# Patient Record
Sex: FEMALE | Race: WHITE | Employment: UNEMPLOYED | URBAN - METROPOLITAN AREA
[De-identification: names, ages, dates, MRNs, and addresses within clinical notes are randomized per-mention and may not be internally consistent; named-entity substitution may affect disease eponyms.]

---

## 2019-08-06 VITALS
BODY MASS INDEX: 33.98 KG/M2 | WEIGHT: 203.93 LBS | TEMPERATURE: 98 F | OXYGEN SATURATION: 98 % | SYSTOLIC BLOOD PRESSURE: 148 MMHG | DIASTOLIC BLOOD PRESSURE: 85 MMHG | HEART RATE: 69 BPM | RESPIRATION RATE: 14 BRPM | HEIGHT: 65 IN

## 2019-08-06 PROCEDURE — 85027 COMPLETE CBC AUTOMATED: CPT

## 2019-08-06 PROCEDURE — 81001 URINALYSIS AUTO W/SCOPE: CPT

## 2019-08-06 PROCEDURE — 36415 COLL VENOUS BLD VENIPUNCTURE: CPT

## 2019-08-06 PROCEDURE — 99284 EMERGENCY DEPT VISIT MOD MDM: CPT

## 2019-08-06 PROCEDURE — 96372 THER/PROPH/DIAG INJ SC/IM: CPT

## 2019-08-06 PROCEDURE — 96365 THER/PROPH/DIAG IV INF INIT: CPT

## 2019-08-07 ENCOUNTER — HOSPITAL ENCOUNTER (EMERGENCY)
Age: 19
Discharge: HOME | End: 2019-08-07
Attending: EMERGENCY MEDICINE
Payer: COMMERCIAL

## 2019-08-07 DIAGNOSIS — N39.0 URINARY TRACT INFECTION WITH HEMATURIA, SITE UNSPECIFIED: Primary | ICD-10-CM

## 2019-08-07 DIAGNOSIS — R31.9 URINARY TRACT INFECTION WITH HEMATURIA, SITE UNSPECIFIED: Primary | ICD-10-CM

## 2019-08-07 LAB
APPEARANCE UR: ABNORMAL
BACTERIA URNS QL MICRO: NEGATIVE /HPF
BILIRUB UR QL: NEGATIVE
C TRACH DNA SPEC QL NAA+PROBE: NEGATIVE
CLUE CELLS VAG QL WET PREP: NORMAL
COLOR UR: ABNORMAL
EPITH CASTS URNS QL MICRO: ABNORMAL /LPF
ERYTHROCYTE [DISTWIDTH] IN BLOOD BY AUTOMATED COUNT: 12.7 % (ref 11.5–14.5)
GLUCOSE UR STRIP.AUTO-MCNC: NEGATIVE MG/DL
HCT VFR BLD AUTO: 38.9 % (ref 35–47)
HGB BLD-MCNC: 13.1 G/DL (ref 11.5–16)
HGB UR QL STRIP: ABNORMAL
HYALINE CASTS URNS QL MICRO: ABNORMAL /LPF (ref 0–5)
KETONES UR QL STRIP.AUTO: NEGATIVE MG/DL
KOH PREP SPEC: NORMAL
LEUKOCYTE ESTERASE UR QL STRIP.AUTO: ABNORMAL
MCH RBC QN AUTO: 31.8 PG (ref 26–34)
MCHC RBC AUTO-ENTMCNC: 33.7 G/DL (ref 30–36.5)
MCV RBC AUTO: 94.4 FL (ref 80–99)
N GONORRHOEA DNA SPEC QL NAA+PROBE: NEGATIVE
NITRITE UR QL STRIP.AUTO: NEGATIVE
NRBC # BLD: 0 K/UL (ref 0–0.01)
NRBC BLD-RTO: 0 PER 100 WBC
PH UR STRIP: 6.5 [PH] (ref 5–8)
PLATELET # BLD AUTO: 266 K/UL (ref 150–400)
PMV BLD AUTO: 11.6 FL (ref 8.9–12.9)
PROT UR STRIP-MCNC: 300 MG/DL
RBC # BLD AUTO: 4.12 M/UL (ref 3.8–5.2)
RBC #/AREA URNS HPF: >100 /HPF (ref 0–5)
SAMPLE TYPE: NORMAL
SERVICE CMNT-IMP: NORMAL
SERVICE CMNT-IMP: NORMAL
SP GR UR REFRACTOMETRY: 1.01 (ref 1–1.03)
SPECIMEN SOURCE: NORMAL
T VAGINALIS VAG QL WET PREP: NORMAL
UROBILINOGEN UR QL STRIP.AUTO: 0.2 EU/DL (ref 0.2–1)
WBC # BLD AUTO: 15.6 K/UL (ref 3.6–11)
WBC URNS QL MICRO: >100 /HPF (ref 0–4)

## 2019-08-07 PROCEDURE — 87210 SMEAR WET MOUNT SALINE/INK: CPT

## 2019-08-07 PROCEDURE — 87491 CHLMYD TRACH DNA AMP PROBE: CPT

## 2019-08-07 PROCEDURE — 74011250636 HC RX REV CODE- 250/636: Performed by: EMERGENCY MEDICINE

## 2019-08-07 PROCEDURE — 74011000258 HC RX REV CODE- 258: Performed by: EMERGENCY MEDICINE

## 2019-08-07 RX ORDER — ONDANSETRON 4 MG/1
4 TABLET, ORALLY DISINTEGRATING ORAL
Qty: 10 TAB | Refills: 0 | Status: ON HOLD | OUTPATIENT
Start: 2019-08-07 | End: 2020-10-08 | Stop reason: CLARIF

## 2019-08-07 RX ORDER — CEFUROXIME AXETIL 500 MG/1
500 TABLET ORAL 2 TIMES DAILY
Qty: 14 TAB | Refills: 0 | Status: SHIPPED | OUTPATIENT
Start: 2019-08-07 | End: 2019-08-14

## 2019-08-07 RX ADMIN — LIDOCAINE HYDROCHLORIDE 1 G: 10 INJECTION, SOLUTION EPIDURAL; INFILTRATION; INTRACAUDAL; PERINEURAL at 02:10

## 2019-08-07 RX ADMIN — CEFTRIAXONE 1 G: 1 INJECTION, POWDER, FOR SOLUTION INTRAMUSCULAR; INTRAVENOUS at 01:50

## 2019-08-07 NOTE — LETTER
[address] 
[facility] EMERGENCY DEPT 
[address] [place] [id number] [phone] Work/School Note Date: 8/6/2019 To Whom It May concern: 
 
Guillaume Null was seen and treated today in the emergency room by the following provider(s): 
Attending Provider: Jil Herndon MD. Guillaume Null should be excused from work on August 7, 2019. Sincerely, Candace Nguyen MD

## 2019-08-07 NOTE — ED PROVIDER NOTES
EMERGENCY DEPARTMENT HISTORY AND PHYSICAL EXAM      Date: 8/7/2019  Patient Name: Steven Taylor    History of Presenting Illness     Chief Complaint   Patient presents with    Vaginal Bleeding       History Provided By: Patient and Patient's Mother    HPI: Steven Taylor, 22 y.o. female with PMHx significant for vaginal herpes infection, presents to the emergency room with chief complaint of lower abdominal pain, hematuria and urinary frequency. Initially patient started with some burning with urination yesterday which she thought was a herpes flareup, but through the day today she developed urinary frequency, lower abdominal and suprapubic pain with urination, and some hematuria. She decided to come in this evening because she had such severe pain with urinating. She denies nausea, vomiting, diarrhea, flank pain, fever, chills, vaginal discharge, irregular vaginal bleeding. Her last menstrual cycle was 2 weeks ago. PCP: None    No current facility-administered medications on file prior to encounter. Current Outpatient Medications on File Prior to Encounter   Medication Sig Dispense Refill    norgestimate-ethinyl estradiol (ORTHO-CYCLEN, [facility]) 0.25-35 mg-mcg tab Take 1 Tab by mouth daily.  ibuprofen (MOTRIN) 600 mg tablet Take 1 Tab by mouth every six (6) hours as needed for Pain. 20 Tab 0    albuterol (PROVENTIL HFA, VENTOLIN HFA, PROAIR HFA) 90 mcg/actuation inhaler Take 2 Puffs by inhalation every four (4) hours as needed for Wheezing. 1 Inhaler 0    valACYclovir (VALTREX) 500 mg tablet Take 500 mg by mouth daily. Past History     Past Medical History:  Past Medical History:   Diagnosis Date    Asthma     Lumbar herniated disc        Past Surgical History:  No past surgical history on file. Family History:  No family history on file.     Social History:  Social History     Tobacco Use    Smoking status: Never Smoker    Smokeless tobacco: Never Used   Substance Use Topics    Alcohol use: No    Drug use: Not on file       Allergies:  No Known Allergies      Review of Systems   Review of Systems   Constitutional: Negative for chills and fever. HENT: Negative for congestion, ear discharge, sinus pain and sore throat. Eyes: Negative. Respiratory: Negative for cough, chest tightness and shortness of breath. Cardiovascular: Negative for chest pain, palpitations and leg swelling. Gastrointestinal: Negative for abdominal pain, constipation, nausea and vomiting. Genitourinary: Positive for dysuria, frequency, hematuria and pelvic pain. Negative for flank pain, vaginal bleeding and vaginal discharge. Musculoskeletal: Negative for back pain and neck pain. Skin: Negative for rash and wound. Neurological: Negative for syncope, weakness, light-headedness and headaches. Psychiatric/Behavioral: Negative for agitation. The patient is not nervous/anxious.           Physical Exam   General appearance - well nourished, well appearing, and in no distress  Eyes - pupils equal and reactive, extraocular eye movements intact  ENT - mucous membranes moist, pharynx normal without lesions  Neck - supple, no significant adenopathy; non-tender to palpation  Chest - clear to auscultation, no wheezes, rales or rhonchi; non-tender to palpation  Heart - normal rate and regular rhythm, S1 and S2 normal, no murmurs noted  Abdomen - soft, mild suprapubic tenderness, nondistended, no masses or organomegaly  Musculoskeletal - no joint tenderness, deformity or swelling; normal ROM  Extremities - peripheral pulses normal, no pedal edema  Skin - normal coloration and turgor, no rashes  Neurological - alert, oriented x3, normal speech, no focal findings or movement disorder noted    Pelvic exam: External vulva within normal limits, no vaginal bleeding, mild tenderness over uterus/suprapubic area, no tenderness over adnexa, moderate amount of thick white discharge    Diagnostic Study Results Labs -     Recent Results (from the past 12 hour(s))   URINALYSIS W/ RFLX MICROSCOPIC    Collection Time: 08/06/19 10:59 PM   Result Value Ref Range    Color YELLOW/STRAW      Appearance CLOUDY (A) CLEAR      Specific gravity 1.015 1.003 - 1.030      pH (UA) 6.5 5.0 - 8.0      Protein 300 (A) NEG mg/dL    Glucose NEGATIVE  NEG mg/dL    Ketone NEGATIVE  NEG mg/dL    Bilirubin NEGATIVE  NEG      Blood LARGE (A) NEG      Urobilinogen 0.2 0.2 - 1.0 EU/dL    Nitrites NEGATIVE  NEG      Leukocyte Esterase MODERATE (A) NEG      WBC >100 (H) 0 - 4 /hpf    RBC >100 (H) 0 - 5 /hpf    Epithelial cells FEW FEW /lpf    Bacteria NEGATIVE  NEG /hpf    Hyaline cast 2-5 0 - 5 /lpf   CBC W/O DIFF    Collection Time: 08/06/19 11:38 PM   Result Value Ref Range    WBC 15.6 (H) 3.6 - 11.0 K/uL    RBC 4.12 3.80 - 5.20 M/uL    HGB 13.1 11.5 - 16.0 g/dL    HCT 38.9 35.0 - 47.0 %    MCV 94.4 80.0 - 99.0 FL    MCH 31.8 26.0 - 34.0 PG    MCHC 33.7 30.0 - 36.5 g/dL    RDW 12.7 11.5 - 14.5 %    PLATELET 325 110 - 377 K/uL    MPV 11.6 8.9 - 12.9 FL    NRBC 0.0 0  WBC    ABSOLUTE NRBC 0.00 0.00 - 0.01 K/uL   KOH, OTHER SOURCES    Collection Time: 08/07/19  1:50 AM   Result Value Ref Range    Special Requests: NO SPECIAL REQUESTS      KOH NO YEAST SEEN     WET PREP    Collection Time: 08/07/19  1:50 AM   Result Value Ref Range    Clue cells CLUE CELLS ABSENT      Wet prep NO TRICHOMONAS SEEN         Radiologic Studies -   No orders to display     CT Results  (Last 48 hours)    None        CXR Results  (Last 48 hours)    None            Medical Decision Making   I am the first provider for this patient. I reviewed the vital signs, available nursing notes, past medical history, past surgical history, family history and social history. Vital Signs-Reviewed the patient's vital signs.   Patient Vitals for the past 12 hrs:   Temp Pulse Resp BP SpO2   08/06/19 2252 98 °F (36.7 °C) 69 14 148/85 98 %           Records Reviewed: Nursing Notes and Old Medical Records    Provider Notes (Medical Decision Making):   Differential diagnosis: UTI, vaginal infection, herpes outbreak,    ED Course:   Initial assessment performed. The patients presenting problems have been discussed, and they are in agreement with the care plan formulated and outlined with them. I have encouraged them to ask questions as they arise throughout their visit. Progress Notes:       UA shows significant white cells and red cells although no bacteria seen. Given patient's symptoms we will treat for UTI. Disposition  Discharged home. PLAN:  1. Current Discharge Medication List      START taking these medications    Details   cefUROXime (CEFTIN) 500 mg tablet Take 1 Tab by mouth two (2) times a day for 7 days. Qty: 14 Tab, Refills: 0      ondansetron (ZOFRAN ODT) 4 mg disintegrating tablet Take 1 Tab by mouth every eight (8) hours as needed for Nausea. Qty: 10 Tab, Refills: 0           2. Follow-up Information     Follow up With Specialties Details Why Contact Info    [facility] EMERGENCY DEPT Emergency Medicine  If symptoms worsen [address] [place] [address]  [phone]        Return to ED if worse     Diagnosis     Clinical Impression:   1.  Urinary tract infection with hematuria, site unspecified

## 2019-08-07 NOTE — ED TRIAGE NOTES
Vaginal pain for couple of days. Uterine pain today. Vaginal bleeding started today. Also complaints of pain after urination.

## 2019-08-07 NOTE — ED NOTES
Pt discharged home with written and verbal instructions given by Dr. Audra Pemberton and patient ambulated out of ED without difficulty.

## 2019-08-07 NOTE — DISCHARGE INSTRUCTIONS
Patient Education        Blood in the Urine: Care Instructions  Your Care Instructions    Blood in the urine, or hematuria, may make the urine look red, brown, or pink. There may be blood every time you urinate or just from time to time. You cannot always see blood in the urine, but it will show up in a urine test.  Blood in the urine may be serious. It should always be checked by a doctor. Your doctor may recommend more tests, including an X-ray, a CT scan, or a cystoscopy (which lets a doctor look inside the urethra and bladder). Blood in the urine can be a sign of another problem. Common causes are bladder infections and kidney stones. An injury to your groin or your genital area can also cause bleeding in the urinary tract. Very hard exercise--such as running a marathon--can cause blood in the urine. Blood in the urine can also be a sign of kidney disease or cancer in the bladder or kidney. Many cases of blood in the urine are caused by a harmless condition that runs in families. This is called benign familial hematuria. It does not need any treatment. Sometimes your urine may look red or brown even though it does not contain blood. For example, not getting enough fluids (dehydration), taking certain medicines, or having a liver problem can change the color of your urine. Eating foods such as beets, rhubarb, or blackberries or foods with red food coloring can make your urine look red or pink. Follow-up care is a key part of your treatment and safety. Be sure to make and go to all appointments, and call your doctor if you are having problems. It's also a good idea to know your test results and keep a list of the medicines you take. When should you call for help? Call your doctor now or seek immediate medical care if:    · You have symptoms of a urinary infection. For example:  ? You have pus in your urine. ? You have pain in your back just below your rib cage. This is called flank pain. ?  You have a fever, chills, or body aches. ? It hurts to urinate. ? You have groin or belly pain.     · You have more blood in your urine.    Watch closely for changes in your health, and be sure to contact your doctor if:    · You have new urination problems.     · You do not get better as expected. Where can you learn more? Go to http://dionne-roldan.info/. Enter D656 in the search box to learn more about \"Blood in the Urine: Care Instructions. \"  Current as of: December 19, 2018  Content Version: 12.1  © 0252-4016 [employer]. Care instructions adapted under license by [employer] (which disclaims liability or warranty for this information). If you have questions about a medical condition or this instruction, always ask your healthcare professional. [address] any warranty or liability for your use of this information. Patient Education        Urinary Tract Infection in Women: Care Instructions  Your Care Instructions    A urinary tract infection, or UTI, is a general term for an infection anywhere between the kidneys and the urethra (where urine comes out). Most UTIs are bladder infections. They often cause pain or burning when you urinate. UTIs are caused by bacteria and can be cured with antibiotics. Be sure to complete your treatment so that the infection goes away. Follow-up care is a key part of your treatment and safety. Be sure to make and go to all appointments, and call your doctor if you are having problems. It's also a good idea to know your test results and keep a list of the medicines you take. How can you care for yourself at home? · Take your antibiotics as directed. Do not stop taking them just because you feel better. You need to take the full course of antibiotics. · Drink extra water and other fluids for the next day or two. This may help wash out the bacteria that are causing the infection.  (If you have kidney, heart, or liver disease and have to limit fluids, talk with your doctor before you increase your fluid intake.)  · Avoid drinks that are carbonated or have caffeine. They can irritate the bladder. · Urinate often. Try to empty your bladder each time. · To relieve pain, take a hot bath or lay a heating pad set on low over your lower belly or genital area. Never go to sleep with a heating pad in place. To prevent UTIs  · Drink plenty of water each day. This helps you urinate often, which clears bacteria from your system. (If you have kidney, heart, or liver disease and have to limit fluids, talk with your doctor before you increase your fluid intake.)  · Urinate when you need to. · Urinate right after you have sex. · Change sanitary pads often. · Avoid douches, bubble baths, feminine hygiene sprays, and other feminine hygiene products that have deodorants. · After going to the bathroom, wipe from front to back. When should you call for help? Call your doctor now or seek immediate medical care if:    · Symptoms such as fever, chills, nausea, or vomiting get worse or appear for the first time.     · You have new pain in your back just below your rib cage. This is called flank pain.     · There is new blood or pus in your urine.     · You have any problems with your antibiotic medicine.    Watch closely for changes in your health, and be sure to contact your doctor if:    · You are not getting better after taking an antibiotic for 2 days.     · Your symptoms go away but then come back. Where can you learn more? Go to http://dionne-roldan.info/. Enter K031 in the search box to learn more about \"Urinary Tract Infection in Women: Care Instructions. \"  Current as of: December 19, 2018  Content Version: 12.1  © 4920-0045 [employer]. Care instructions adapted under license by [employer] (which disclaims liability or warranty for this information).  If you have questions about a medical condition or this instruction, always ask your healthcare professional. [place] [address] any warranty or liability for your use of this information.

## 2020-05-08 LAB
ANTIBODY SCREEN, EXTERNAL: NEGATIVE
CHLAMYDIA, EXTERNAL: NEGATIVE
HBSAG, EXTERNAL: NEGATIVE
HIV, EXTERNAL: NONREACTIVE
N. GONORRHEA, EXTERNAL: NEGATIVE
RPR, EXTERNAL: NONREACTIVE
RUBELLA, EXTERNAL: NORMAL
T. PALLIDUM, EXTERNAL: NONREACTIVE
TYPE, ABO & RH, EXTERNAL: NORMAL

## 2020-09-21 LAB — ANTIBODY SCREEN, EXTERNAL: NEGATIVE

## 2020-10-08 ENCOUNTER — HOSPITAL ENCOUNTER (OUTPATIENT)
Age: 20
Discharge: HOME | End: 2020-10-09
Attending: EMERGENCY MEDICINE | Admitting: OBSTETRICS & GYNECOLOGY
Payer: COMMERCIAL

## 2020-10-08 DIAGNOSIS — O26.899 ABDOMINAL PAIN AFFECTING PREGNANCY: Primary | ICD-10-CM

## 2020-10-08 DIAGNOSIS — R10.9 ABDOMINAL PAIN AFFECTING PREGNANCY: Primary | ICD-10-CM

## 2020-10-08 PROBLEM — Z34.90 PREGNANCY: Status: ACTIVE | Noted: 2020-10-08

## 2020-10-08 LAB
APPEARANCE UR: CLEAR
BACTERIA URNS QL MICRO: NEGATIVE /HPF
BASOPHILS # BLD: 0 K/UL (ref 0–0.1)
BASOPHILS NFR BLD: 0 % (ref 0–1)
BILIRUB UR QL: NEGATIVE
COLOR UR: ABNORMAL
DIFFERENTIAL METHOD BLD: ABNORMAL
EOSINOPHIL # BLD: 0.1 K/UL (ref 0–0.4)
EOSINOPHIL NFR BLD: 0 % (ref 0–7)
EPITH CASTS URNS QL MICRO: ABNORMAL /LPF
ERYTHROCYTE [DISTWIDTH] IN BLOOD BY AUTOMATED COUNT: 12.7 % (ref 11.5–14.5)
GLUCOSE UR STRIP.AUTO-MCNC: NEGATIVE MG/DL
HCT VFR BLD AUTO: 33.9 % (ref 35–47)
HGB BLD-MCNC: 11.6 G/DL (ref 11.5–16)
HGB UR QL STRIP: NEGATIVE
HYALINE CASTS URNS QL MICRO: ABNORMAL /LPF (ref 0–5)
IMM GRANULOCYTES # BLD AUTO: 0.1 K/UL (ref 0–0.04)
IMM GRANULOCYTES NFR BLD AUTO: 1 % (ref 0–0.5)
KETONES UR QL STRIP.AUTO: ABNORMAL MG/DL
LEUKOCYTE ESTERASE UR QL STRIP.AUTO: ABNORMAL
LYMPHOCYTES # BLD: 2.6 K/UL (ref 0.8–3.5)
LYMPHOCYTES NFR BLD: 19 % (ref 12–49)
MCH RBC QN AUTO: 32.2 PG (ref 26–34)
MCHC RBC AUTO-ENTMCNC: 34.2 G/DL (ref 30–36.5)
MCV RBC AUTO: 94.2 FL (ref 80–99)
MONOCYTES # BLD: 0.7 K/UL (ref 0–1)
MONOCYTES NFR BLD: 5 % (ref 5–13)
NEUTS SEG # BLD: 10.3 K/UL (ref 1.8–8)
NEUTS SEG NFR BLD: 75 % (ref 32–75)
NITRITE UR QL STRIP.AUTO: NEGATIVE
NRBC # BLD: 0 K/UL (ref 0–0.01)
NRBC BLD-RTO: 0 PER 100 WBC
PH UR STRIP: 6.5 [PH] (ref 5–8)
PLATELET # BLD AUTO: 222 K/UL (ref 150–400)
PMV BLD AUTO: 11.8 FL (ref 8.9–12.9)
PROT UR STRIP-MCNC: NEGATIVE MG/DL
RBC # BLD AUTO: 3.6 M/UL (ref 3.8–5.2)
RBC #/AREA URNS HPF: ABNORMAL /HPF (ref 0–5)
SP GR UR REFRACTOMETRY: 1.01 (ref 1–1.03)
UA: UC IF INDICATED,UAUC: ABNORMAL
UROBILINOGEN UR QL STRIP.AUTO: 0.2 EU/DL (ref 0.2–1)
WBC # BLD AUTO: 13.7 K/UL (ref 3.6–11)
WBC URNS QL MICRO: ABNORMAL /HPF (ref 0–4)

## 2020-10-08 PROCEDURE — 81001 URINALYSIS AUTO W/SCOPE: CPT

## 2020-10-08 PROCEDURE — 85025 COMPLETE CBC W/AUTO DIFF WBC: CPT

## 2020-10-08 PROCEDURE — 83615 LACTATE (LD) (LDH) ENZYME: CPT

## 2020-10-08 PROCEDURE — 80053 COMPREHEN METABOLIC PANEL: CPT

## 2020-10-08 PROCEDURE — 84550 ASSAY OF BLOOD/URIC ACID: CPT

## 2020-10-08 PROCEDURE — 84156 ASSAY OF PROTEIN URINE: CPT

## 2020-10-08 PROCEDURE — 36415 COLL VENOUS BLD VENIPUNCTURE: CPT

## 2020-10-08 PROCEDURE — 75810000275 HC EMERGENCY DEPT VISIT NO LEVEL OF CARE

## 2020-10-08 RX ORDER — CETIRIZINE HCL 10 MG
10 TABLET ORAL
COMMUNITY

## 2020-10-08 RX ORDER — VITAMIN A ACETATE, BETA CAROTENE, ASCORBIC ACID, CHOLECALCIFEROL, .ALPHA.-TOCOPHEROL ACETATE, DL-, THIAMINE MONONITRATE, RIBOFLAVIN, NIACINAMIDE, PYRIDOXINE HYDROCHLORIDE, FOLIC ACID, CYANOCOBALAMIN, CALCIUM CARBONATE, FERROUS FUMARATE, ZINC OXIDE, CUPRIC OXIDE 3080; 12; 120; 400; 1; 1.84; 3; 20; 22; 920; 25; 200; 27; 10; 2 [IU]/1; UG/1; MG/1; [IU]/1; MG/1; MG/1; MG/1; MG/1; MG/1; [IU]/1; MG/1; MG/1; MG/1; MG/1; MG/1
1 TABLET, FILM COATED ORAL DAILY
COMMUNITY

## 2020-10-09 VITALS
HEART RATE: 85 BPM | SYSTOLIC BLOOD PRESSURE: 112 MMHG | WEIGHT: 240 LBS | RESPIRATION RATE: 18 BRPM | BODY MASS INDEX: 40.97 KG/M2 | DIASTOLIC BLOOD PRESSURE: 62 MMHG | OXYGEN SATURATION: 96 % | TEMPERATURE: 98.2 F | HEIGHT: 64 IN

## 2020-10-09 LAB
ALBUMIN SERPL-MCNC: 2.9 G/DL (ref 3.5–5)
ALBUMIN/GLOB SERPL: 0.7 {RATIO} (ref 1.1–2.2)
ALP SERPL-CCNC: 93 U/L (ref 45–117)
ALT SERPL-CCNC: 22 U/L (ref 12–78)
ANION GAP SERPL CALC-SCNC: 8 MMOL/L (ref 5–15)
AST SERPL-CCNC: 16 U/L (ref 15–37)
BILIRUB SERPL-MCNC: 0.2 MG/DL (ref 0.2–1)
BUN SERPL-MCNC: 9 MG/DL (ref 6–20)
BUN/CREAT SERPL: 13 (ref 12–20)
CALCIUM SERPL-MCNC: 8.5 MG/DL (ref 8.5–10.1)
CHLORIDE SERPL-SCNC: 109 MMOL/L (ref 97–108)
CO2 SERPL-SCNC: 21 MMOL/L (ref 21–32)
CREAT SERPL-MCNC: 0.72 MG/DL (ref 0.55–1.02)
CREAT UR-MCNC: 89.2 MG/DL
GLOBULIN SER CALC-MCNC: 4.2 G/DL (ref 2–4)
GLUCOSE SERPL-MCNC: 132 MG/DL (ref 65–100)
LDH SERPL L TO P-CCNC: 210 U/L (ref 81–246)
POTASSIUM SERPL-SCNC: 3.4 MMOL/L (ref 3.5–5.1)
PROT SERPL-MCNC: 7.1 G/DL (ref 6.4–8.2)
PROT UR-MCNC: 6 MG/DL (ref 0–11.9)
PROT/CREAT UR-RTO: 0.1
SODIUM SERPL-SCNC: 138 MMOL/L (ref 136–145)
URATE SERPL-MCNC: 4.4 MG/DL (ref 2.6–6)

## 2020-10-09 PROCEDURE — 59025 FETAL NON-STRESS TEST: CPT

## 2020-10-09 PROCEDURE — 99285 EMERGENCY DEPT VISIT HI MDM: CPT

## 2020-10-09 NOTE — PROCEDURES
Non-Stress Test    Brief history, indication: intermittent tightening  In the abdomen    Findings:  Baseline  bpm; moderate variability; greater than two accelerations to 150 bpm; no significant decelerations noted.     Result: Reactive NST    Nonstress test interpreted by myself      Krystin Hudson MD

## 2020-10-09 NOTE — ED PROVIDER NOTES
10:36 PM    23 y.o. female presents by private vehicle. to ED with a c/o intermittent abdominal tightness. Pt reports sx began 4.5 hours ago. Pt denies vaginal bleeding or leaking of fluid. Fetal movement in 24 hrs: yes     Gravid 1 Para 0  0   approximately 30 weeks pregnant  Hx of prior: none    PMHx is significant for: none  PSHx is significant for: unknown  Social Hx: denies Tobacco, denies EtOH, denies Illicit Drugs    Is blood pressure high?: no  Swelling in legs?: no    There are no other sxs or complaints noted at this time. ROS:  1) Denies chest chest pain, palpitations  2) Denies nausea or vomiting  All other Systems Negative    Patient Vitals for the past 12 hrs:   Temp Pulse Resp BP SpO2   10/08/20 2236 98.6 °F (37 °C) 98 20 (!) 132/98 100 %       PE:  General appearance - well nourished, well appearing, and in no distress  Eyes - pupils equal and reactive, extraocular eye movements intact  ENT - mucous membranes moist, pharynx normal without lesions  Neck - supple, no significant adenopathy;   Chest - clear to auscultation, no wheezes, rales or rhonchi; non-tender to palpation  Heart - normal rate and regular rhythm, S1 and S2 normal, no murmurs noted  Abdomen - soft, nontender, nondistended, Gravid Uterus  Musculoskeletal - no joint tenderness, deformity or swelling; normal ROM  Extremities - peripheral pulses normal, no pedal edema  Skin - normal coloration and turgor, no rashes  Neurological - alert, oriented x3, normal speech, no focal findings or movement disorder noted    PLAN:  1. Sent to Labor and Delivery.

## 2020-10-09 NOTE — H&P
OB History & Physical    Name: Pat Son MRN: [id number]  SSN: [id number]    YOB: 2000  Age: 23 y.o. Sex: female      Subjective:     Reason for Admission:  Pregnancy and stomach tighteneing    History of Present Illness: Pat Son is a 23 y.o.  female with an estimated gestational age of 30w3d with Estimated Date of Delivery: 20. Patient complains of intermittent tightening in her stomach since earlier this evening. She denies significant cramping, bleeding, ROM. Baby has been active. She denies NVFSC. The patient reports a history of HSV, had an outbreak 1 week ago. OB History        1    Para        Term                AB        Living           SAB        TAB        Ectopic        Molar        Multiple        Live Births                  Past Medical History:   Diagnosis Date    Asthma     no recent inhaler use    Genital herpes     Herpes gestationis     this week    Lumbar herniated disc      History reviewed. No pertinent surgical history. Social History     Occupational History    Not on file   Tobacco Use    Smoking status: Never Smoker    Smokeless tobacco: Never Used   Substance and Sexual Activity    Alcohol use: No    Drug use: Never    Sexual activity: Yes     Family History   Problem Relation Age of Onset    Asthma Mother     Diabetes Mother     Hypertension Mother     Cancer Maternal Grandmother     Lung Disease Maternal Grandmother     Cancer Maternal Grandfather     Diabetes Maternal Grandfather     Elevated Lipids Maternal Grandfather     Cancer Paternal Grandmother     Dementia Paternal Grandmother     Cancer Paternal Grandfather     Diabetes Paternal Grandfather     Hypertension Paternal Grandfather     Kidney Disease Paternal Grandfather        No Known Allergies  Prior to Admission medications    Medication Sig Start Date End Date Taking?  Authorizing Provider   cetirizine (ZyrTEC) 10 mg tablet Take 10 mg by mouth.   Yes Provider, Historical   prenatal vit-calcium-iron-fa (Prenatal Plus, calcium carb,) 27 mg iron- 1 mg tab Take 1 Tab by mouth daily. Yes Provider, Historical   valACYclovir (VALTREX) 500 mg tablet Take 500 mg by mouth daily.    Yes Other, MD Zo        Review of Systems:  General: Denies fever, sweats, chills  CV: Denies CP, palpitations  Resp: Denies SOB, cough  GI: Denies nausea, vomiting, diarrhea  : Denies dysura, abnormal frequency, hematuria  Neuro: Denies HA, visual disturbance  All other systems reviewed and are negative    Objective:     Vitals:    Vitals:    10/08/20 2352 10/09/20 0001 10/09/20 0011 10/09/20 0021   BP: (!) 110/55 (!) 110/55 112/60 112/62   Pulse: 80 88 82 85   Resp:       Temp:       SpO2:  98% 97% 96%   Weight:       Height:          Temp (24hrs), Av.4 °F (36.9 °C), Min:98.2 °F (36.8 °C), Max:98.6 °F (37 °C)    BP  Min: 110/55  Max: 152/83     Physical Exam  General: in NAD  HEENT: normocephalic  Back: no CVAT  Abdomen: Gravid, soft, nontender, no abnormal masses, rebound, rigidity, guarding  Fundus: soft, nontender  Extremities: no abnormal cyanosis, clubbing, edema  Skin: warm, dry, no abnormal lesions noted  Neurological: DTR's 1-2+ no clonus  Pelvic:Cervical Exam: not checked  Uterine Activity: no contractions detected  Membranes: no gross evidence of ROM  Fetal Heart Rate: adequate variability and reactivity; no significant abnormal decelerations    Labs:   Recent Results (from the past 24 hour(s))   URINALYSIS W/ REFLEX CULTURE    Collection Time: 10/08/20 11:31 PM    Specimen: Urine   Result Value Ref Range    Color YELLOW/STRAW      Appearance CLEAR CLEAR      Specific gravity 1.011 1.003 - 1.030      pH (UA) 6.5 5.0 - 8.0      Protein Negative NEG mg/dL    Glucose Negative NEG mg/dL    Ketone TRACE (A) NEG mg/dL    Bilirubin Negative NEG      Blood Negative NEG      Urobilinogen 0.2 0.2 - 1.0 EU/dL    Nitrites Negative NEG      Leukocyte Esterase TRACE (A) NEG WBC 0-4 0 - 4 /hpf    RBC 0-5 0 - 5 /hpf    Epithelial cells FEW FEW /lpf    Bacteria Negative NEG /hpf    UA:UC IF INDICATED CULTURE NOT INDICATED BY UA RESULT CNI      Hyaline cast 0-2 0 - 5 /lpf   PROTEIN/CREATININE RATIO, URINE    Collection Time: 10/08/20 11:31 PM   Result Value Ref Range    Protein, urine random 6 0.0 - 11.9 mg/dL    Creatinine, urine 89.20 mg/dL    Protein/Creat. urine Ratio 0.1     CBC WITH AUTOMATED DIFF    Collection Time: 10/08/20 11:31 PM   Result Value Ref Range    WBC 13.7 (H) 3.6 - 11.0 K/uL    RBC 3.60 (L) 3.80 - 5.20 M/uL    HGB 11.6 11.5 - 16.0 g/dL    HCT 33.9 (L) 35.0 - 47.0 %    MCV 94.2 80.0 - 99.0 FL    MCH 32.2 26.0 - 34.0 PG    MCHC 34.2 30.0 - 36.5 g/dL    RDW 12.7 11.5 - 14.5 %    PLATELET 152 232 - 864 K/uL    MPV 11.8 8.9 - 12.9 FL    NRBC 0.0 0  WBC    ABSOLUTE NRBC 0.00 0.00 - 0.01 K/uL    NEUTROPHILS 75 32 - 75 %    LYMPHOCYTES 19 12 - 49 %    MONOCYTES 5 5 - 13 %    EOSINOPHILS 0 0 - 7 %    BASOPHILS 0 0 - 1 %    IMMATURE GRANULOCYTES 1 (H) 0.0 - 0.5 %    ABS. NEUTROPHILS 10.3 (H) 1.8 - 8.0 K/UL    ABS. LYMPHOCYTES 2.6 0.8 - 3.5 K/UL    ABS. MONOCYTES 0.7 0.0 - 1.0 K/UL    ABS. EOSINOPHILS 0.1 0.0 - 0.4 K/UL    ABS. BASOPHILS 0.0 0.0 - 0.1 K/UL    ABS. IMM. GRANS. 0.1 (H) 0.00 - 0.04 K/UL    DF AUTOMATED     METABOLIC PANEL, COMPREHENSIVE    Collection Time: 10/08/20 11:31 PM   Result Value Ref Range    Sodium 138 136 - 145 mmol/L    Potassium 3.4 (L) 3.5 - 5.1 mmol/L    Chloride 109 (H) 97 - 108 mmol/L    CO2 21 21 - 32 mmol/L    Anion gap 8 5 - 15 mmol/L    Glucose 132 (H) 65 - 100 mg/dL    BUN 9 6 - 20 MG/DL    Creatinine 0.72 0.55 - 1.02 MG/DL    BUN/Creatinine ratio 13 12 - 20      GFR est AA >60 >60 ml/min/1.73m2    GFR est non-AA >60 >60 ml/min/1.73m2    Calcium 8.5 8.5 - 10.1 MG/DL    Bilirubin, total 0.2 0.2 - 1.0 MG/DL    ALT (SGPT) 22 12 - 78 U/L    AST (SGOT) 16 15 - 37 U/L    Alk.  phosphatase 93 45 - 117 U/L    Protein, total 7.1 6.4 - 8.2 g/dL    Albumin 2.9 (L) 3.5 - 5.0 g/dL    Globulin 4.2 (H) 2.0 - 4.0 g/dL    A-G Ratio 0.7 (L) 1.1 - 2.2     LD    Collection Time: 10/08/20 11:31 PM   Result Value Ref Range     81 - 246 U/L   URIC ACID    Collection Time: 10/08/20 11:31 PM   Result Value Ref Range    Uric acid 4.4 2.6 - 6.0 MG/DL       Patient Active Problem List   Diagnosis Code    Pregnancy Z34.90     Assessment and Plan:     30 week IUP, no evidence or significant  contractions. Discharge home  Follow-up with the office in the morning  Precautions reviewed; questions answered.     Signed By:  Abril Isaac MD     2020

## 2020-10-09 NOTE — PROGRESS NOTES
2242- here from home c/o abdominal tightening since 1830. Patient states positive fetal movement, denies leaking fluids, vaginal bleeding, headache or blurred vision. Patient denies urinary frequency, painful urination or intercourse within the last 48 hours. Patient placed on monitor. 2318-Dr. Juan Otto notified of patient arrival and assessment, orders received to send labs for elevated pressures. 0027-Dr. Juan Otto in to see patient, patient off monitor,  BPM. POC discussed, orders received to discharge patient home. 0035-Discharge instructions reviewed and signed by patient, patient given copy of instructions. Patient ambulatory off unit with significant other with no signs of distress or labor.

## 2020-10-09 NOTE — DISCHARGE INSTRUCTIONS
Patient Education        Weeks 30 to 28 of Your Pregnancy: Care Instructions  Your Care Instructions     You have made it to the final months of your pregnancy. By now, your baby is really starting to look like a baby, with hair and plump skin. As you enter the final weeks of pregnancy, the reality of having a baby may start to set in. This is the time to settle on a name, get your household in order, set up a safe nursery, and find quality  if needed. Doing these things in advance will allow you to focus on caring for and enjoying your new baby. You may also want to have a tour of your hospital's labor and delivery unit to get a better idea of what to expect while you are in the hospital.  During these last months, it is very important to take good care of yourself and pay attention to what your body needs. If your doctor says it is okay for you to work, don't push yourself too hard. Use the tips provided in this care sheet to ease heartburn and care for varicose veins. If you haven't already had the Tdap shot during this pregnancy, talk to your doctor about getting it. It will help protect your  against pertussis infection. Follow-up care is a key part of your treatment and safety. Be sure to make and go to all appointments, and call your doctor if you are having problems. It's also a good idea to know your test results and keep a list of the medicines you take. How can you care for yourself at home? Pay attention to your baby's movements  · You should feel your baby move several times every day. · Your baby now turns less, and kicks and jabs more. · Your baby sleeps 20 to 45 minutes at a time and is more active at certain times of day. · If your doctor wants you to count your baby's kicks:  ? Empty your bladder, and lie on your side or relax in a comfortable chair. ? Write down your start time. ? Pay attention only to your baby's movements. Count any movement except hiccups. ?  After you have counted 10 movements, write down your stop time. ? Write down how many minutes it took for your baby to move 10 times. ? If an hour goes by and you have not recorded 10 movements, have something to eat or drink and then count for another hour. If you do not record 10 movements in either hour, call your doctor. Ease heartburn  · Eat small, frequent meals. · Do not eat chocolate, peppermint, or very spicy foods. Avoid drinks with caffeine, such as coffee, tea, and sodas. · Avoid bending over or lying down after meals. · Talk a short walk after you eat. · If heartburn is a problem at night, do not eat for 2 hours before bedtime. · Take antacids like Mylanta, Maalox, Rolaids, or Tums. Do not take antacids that have sodium bicarbonate. Care for varicose veins  · Varicose veins are blood vessels that stretch out with the extra blood during pregnancy. Your legs may ache or throb. Most varicose veins will go away after the birth. · Avoid standing for long periods of time. Sit with your legs crossed at the ankles, not the knees. · Sit with your feet propped up. · Avoid tight clothing or stockings. Wear support hose. · Exercise regularly. Try walking for at least 30 minutes a day. Where can you learn more? Go to http://www.gray.com/  Enter X471 in the search box to learn more about \"Weeks 30 to 32 of Your Pregnancy: Care Instructions. \"  Current as of: February 11, 2020               Content Version: 12.6  © 6042-1428 [employer]. Care instructions adapted under license by [employer] (which disclaims liability or warranty for this information). If you have questions about a medical condition or this instruction, always ask your healthcare professional. [address] any warranty or liability for your use of this information.

## 2020-11-16 LAB
GRBS, EXTERNAL: NEGATIVE
T. PALLIDUM, EXTERNAL: NONREACTIVE

## 2020-12-03 ENCOUNTER — HOSPITAL ENCOUNTER (OUTPATIENT)
Dept: PREADMISSION TESTING | Age: 20
Discharge: HOME | End: 2020-12-03
Payer: COMMERCIAL

## 2020-12-03 PROCEDURE — 87635 SARS-COV-2 COVID-19 AMP PRB: CPT

## 2020-12-04 LAB
HEALTH STATUS, XMCV2T: NORMAL
SARS-COV-2, COV2NT: NOT DETECTED
SOURCE, COVRS: NORMAL
SPECIMEN SOURCE, FCOV2M: NORMAL
SPECIMEN TYPE, XMCV1T: NORMAL

## 2020-12-10 ENCOUNTER — HOSPITAL ENCOUNTER (INPATIENT)
Age: 20
LOS: 3 days | Discharge: HOME | End: 2020-12-13
Attending: OBSTETRICS & GYNECOLOGY | Admitting: OBSTETRICS & GYNECOLOGY
Payer: COMMERCIAL

## 2020-12-10 PROBLEM — Z34.90 NORMAL PREGNANCY: Status: ACTIVE | Noted: 2020-12-10

## 2020-12-10 LAB
ALBUMIN SERPL-MCNC: 2.6 G/DL (ref 3.5–5)
ALBUMIN SERPL-MCNC: 2.7 G/DL (ref 3.5–5)
ALBUMIN/GLOB SERPL: 0.6 {RATIO} (ref 1.1–2.2)
ALBUMIN/GLOB SERPL: 0.6 {RATIO} (ref 1.1–2.2)
ALP SERPL-CCNC: 171 U/L (ref 45–117)
ALP SERPL-CCNC: 175 U/L (ref 45–117)
ALT SERPL-CCNC: 17 U/L (ref 12–78)
ALT SERPL-CCNC: 19 U/L (ref 12–78)
ANION GAP SERPL CALC-SCNC: 7 MMOL/L (ref 5–15)
ANION GAP SERPL CALC-SCNC: 8 MMOL/L (ref 5–15)
AST SERPL-CCNC: 25 U/L (ref 15–37)
AST SERPL-CCNC: 26 U/L (ref 15–37)
BASOPHILS # BLD: 0 K/UL (ref 0–0.1)
BASOPHILS NFR BLD: 0 % (ref 0–1)
BILIRUB SERPL-MCNC: 0.2 MG/DL (ref 0.2–1)
BILIRUB SERPL-MCNC: 0.5 MG/DL (ref 0.2–1)
BUN SERPL-MCNC: 13 MG/DL (ref 6–20)
BUN SERPL-MCNC: 13 MG/DL (ref 6–20)
BUN/CREAT SERPL: 13 (ref 12–20)
BUN/CREAT SERPL: 14 (ref 12–20)
CALCIUM SERPL-MCNC: 8.5 MG/DL (ref 8.5–10.1)
CALCIUM SERPL-MCNC: 8.8 MG/DL (ref 8.5–10.1)
CHLORIDE SERPL-SCNC: 107 MMOL/L (ref 97–108)
CHLORIDE SERPL-SCNC: 108 MMOL/L (ref 97–108)
CO2 SERPL-SCNC: 22 MMOL/L (ref 21–32)
CO2 SERPL-SCNC: 22 MMOL/L (ref 21–32)
CREAT SERPL-MCNC: 0.91 MG/DL (ref 0.55–1.02)
CREAT SERPL-MCNC: 1 MG/DL (ref 0.55–1.02)
CREAT UR-MCNC: 113 MG/DL
DIFFERENTIAL METHOD BLD: ABNORMAL
EOSINOPHIL # BLD: 0.1 K/UL (ref 0–0.4)
EOSINOPHIL NFR BLD: 1 % (ref 0–7)
ERYTHROCYTE [DISTWIDTH] IN BLOOD BY AUTOMATED COUNT: 13.9 % (ref 11.5–14.5)
GLOBULIN SER CALC-MCNC: 4.1 G/DL (ref 2–4)
GLOBULIN SER CALC-MCNC: 4.2 G/DL (ref 2–4)
GLUCOSE SERPL-MCNC: 82 MG/DL (ref 65–100)
GLUCOSE SERPL-MCNC: 99 MG/DL (ref 65–100)
HCT VFR BLD AUTO: 34 % (ref 35–47)
HGB BLD-MCNC: 11.5 G/DL (ref 11.5–16)
IMM GRANULOCYTES # BLD AUTO: 0 K/UL (ref 0–0.04)
IMM GRANULOCYTES NFR BLD AUTO: 0 % (ref 0–0.5)
LYMPHOCYTES # BLD: 2.4 K/UL (ref 0.8–3.5)
LYMPHOCYTES NFR BLD: 22 % (ref 12–49)
MCH RBC QN AUTO: 31.4 PG (ref 26–34)
MCHC RBC AUTO-ENTMCNC: 33.8 G/DL (ref 30–36.5)
MCV RBC AUTO: 92.9 FL (ref 80–99)
MONOCYTES # BLD: 0.6 K/UL (ref 0–1)
MONOCYTES NFR BLD: 6 % (ref 5–13)
NEUTS SEG # BLD: 7.7 K/UL (ref 1.8–8)
NEUTS SEG NFR BLD: 71 % (ref 32–75)
NRBC # BLD: 0 K/UL (ref 0–0.01)
NRBC BLD-RTO: 0 PER 100 WBC
PLATELET # BLD AUTO: 225 K/UL (ref 150–400)
PMV BLD AUTO: 12.3 FL (ref 8.9–12.9)
POTASSIUM SERPL-SCNC: 4 MMOL/L (ref 3.5–5.1)
POTASSIUM SERPL-SCNC: 4.1 MMOL/L (ref 3.5–5.1)
PROT SERPL-MCNC: 6.7 G/DL (ref 6.4–8.2)
PROT SERPL-MCNC: 6.9 G/DL (ref 6.4–8.2)
PROT UR-MCNC: 22 MG/DL (ref 0–11.9)
PROT/CREAT UR-RTO: 0.2
RBC # BLD AUTO: 3.66 M/UL (ref 3.8–5.2)
SODIUM SERPL-SCNC: 137 MMOL/L (ref 136–145)
SODIUM SERPL-SCNC: 137 MMOL/L (ref 136–145)
WBC # BLD AUTO: 10.8 K/UL (ref 3.6–11)

## 2020-12-10 PROCEDURE — 00HU33Z INSERTION OF INFUSION DEVICE INTO SPINAL CANAL, PERCUTANEOUS APPROACH: ICD-10-PCS | Performed by: ANESTHESIOLOGY

## 2020-12-10 PROCEDURE — 85025 COMPLETE CBC W/AUTO DIFF WBC: CPT

## 2020-12-10 PROCEDURE — 2709999900 HC NON-CHARGEABLE SUPPLY

## 2020-12-10 PROCEDURE — 80053 COMPREHEN METABOLIC PANEL: CPT

## 2020-12-10 PROCEDURE — 84156 ASSAY OF PROTEIN URINE: CPT

## 2020-12-10 PROCEDURE — 77030005513 HC CATH URETH FOL11 MDII -B

## 2020-12-10 PROCEDURE — 36415 COLL VENOUS BLD VENIPUNCTURE: CPT

## 2020-12-10 PROCEDURE — 77030010848 HC CATH INTUTR PRSS KOLB -B

## 2020-12-10 PROCEDURE — 65410000002 HC RM PRIVATE OB

## 2020-12-10 PROCEDURE — 86900 BLOOD TYPING SEROLOGIC ABO: CPT

## 2020-12-10 PROCEDURE — 75410000002 HC LABOR FEE PER 1 HR

## 2020-12-10 RX ORDER — BUTORPHANOL TARTRATE 2 MG/ML
2 INJECTION INTRAMUSCULAR; INTRAVENOUS
Status: DISCONTINUED | OUTPATIENT
Start: 2020-12-10 | End: 2020-12-13 | Stop reason: HOSPADM

## 2020-12-10 RX ORDER — NALOXONE HYDROCHLORIDE 0.4 MG/ML
0.4 INJECTION, SOLUTION INTRAMUSCULAR; INTRAVENOUS; SUBCUTANEOUS AS NEEDED
Status: DISCONTINUED | OUTPATIENT
Start: 2020-12-10 | End: 2020-12-13 | Stop reason: HOSPADM

## 2020-12-10 RX ORDER — SODIUM CHLORIDE 0.9 % (FLUSH) 0.9 %
SYRINGE (ML) INJECTION
Status: DISPENSED
Start: 2020-12-10 | End: 2020-12-11

## 2020-12-10 RX ORDER — SODIUM CHLORIDE 0.9 % (FLUSH) 0.9 %
5-40 SYRINGE (ML) INJECTION EVERY 8 HOURS
Status: DISCONTINUED | OUTPATIENT
Start: 2020-12-10 | End: 2020-12-13 | Stop reason: HOSPADM

## 2020-12-10 RX ORDER — OXYTOCIN/RINGER'S LACTATE 30/500 ML
10 PLASTIC BAG, INJECTION (ML) INTRAVENOUS AS NEEDED
Status: DISCONTINUED | OUTPATIENT
Start: 2020-12-10 | End: 2020-12-13 | Stop reason: HOSPADM

## 2020-12-10 RX ORDER — ACETAMINOPHEN 325 MG/1
650 TABLET ORAL
Status: DISCONTINUED | OUTPATIENT
Start: 2020-12-10 | End: 2020-12-13 | Stop reason: HOSPADM

## 2020-12-10 RX ORDER — SODIUM CHLORIDE 0.9 % (FLUSH) 0.9 %
5-40 SYRINGE (ML) INJECTION AS NEEDED
Status: DISCONTINUED | OUTPATIENT
Start: 2020-12-10 | End: 2020-12-13 | Stop reason: HOSPADM

## 2020-12-10 RX ORDER — SODIUM CHLORIDE, SODIUM LACTATE, POTASSIUM CHLORIDE, CALCIUM CHLORIDE 600; 310; 30; 20 MG/100ML; MG/100ML; MG/100ML; MG/100ML
125 INJECTION, SOLUTION INTRAVENOUS CONTINUOUS
Status: DISCONTINUED | OUTPATIENT
Start: 2020-12-10 | End: 2020-12-13 | Stop reason: HOSPADM

## 2020-12-10 RX ORDER — ONDANSETRON 2 MG/ML
4 INJECTION INTRAMUSCULAR; INTRAVENOUS
Status: DISCONTINUED | OUTPATIENT
Start: 2020-12-10 | End: 2020-12-13 | Stop reason: HOSPADM

## 2020-12-10 RX ORDER — OXYTOCIN/RINGER'S LACTATE 30/500 ML
87.3 PLASTIC BAG, INJECTION (ML) INTRAVENOUS AS NEEDED
Status: DISCONTINUED | OUTPATIENT
Start: 2020-12-10 | End: 2020-12-13 | Stop reason: HOSPADM

## 2020-12-10 RX ORDER — VALACYCLOVIR HYDROCHLORIDE 500 MG/1
500 TABLET, FILM COATED ORAL DAILY
Status: DISCONTINUED | OUTPATIENT
Start: 2020-12-11 | End: 2020-12-13 | Stop reason: HOSPADM

## 2020-12-10 NOTE — H&P
History & Physical    Name: Nain Rogers MRN: [id number]  SSN: [id number]    YOB: 2000  Age: 23 y.o. Sex: female      Subjective:     Estimated Date of Delivery: 20  OB History    Para Term  AB Living   1             SAB TAB Ectopic Molar Multiple Live Births                    # Outcome Date GA Lbr Ronaldo/2nd Weight Sex Delivery Anes PTL Lv   1 Current                Ms. Jarocho June is admitted with pregnancy at 39w3d for induction of labor. Prenatal course c/b:  - maternal obesity complicating pregnancy, childbirth and the puerperium, antepartum  BMI 38.7. ASA advised. BL labs_____. Early glucola___73. FS + ECHO w/o MFM____. Growth q4_24wks - 51st%, 28wks 71st%_, 32wk 62nd%__. ANT @ 36__79th%___.  - asthma - no meds  - Elevated DS risk on NT 1:127 - normal NT measurement, nasal bone present; offered [id number]__[id number]__eoeuzqsy. afp screen is negative. - genital herpes simplex - valtrex at 42 weeks    GIRl! Please see prenatal records for details. Past Medical History:   Diagnosis Date    Asthma     no recent inhaler use    Genital herpes     Herpes gestationis     this week    Lumbar herniated disc      No past surgical history on file.   Social History     Occupational History    Not on file   Tobacco Use    Smoking status: Never Smoker    Smokeless tobacco: Never Used   Substance and Sexual Activity    Alcohol use: No    Drug use: Never    Sexual activity: Yes     Family History   Problem Relation Age of Onset    Asthma Mother     Diabetes Mother     Hypertension Mother     Cancer Maternal Grandmother     Lung Disease Maternal Grandmother     Cancer Maternal Grandfather     Diabetes Maternal Grandfather     Elevated Lipids Maternal Grandfather     Cancer Paternal Grandmother     Dementia Paternal Grandmother     Cancer Paternal Grandfather     Diabetes Paternal Grandfather     Hypertension Paternal Grandfather     Kidney Disease Paternal Grandfather No Known Allergies  Prior to Admission medications    Medication Sig Start Date End Date Taking? Authorizing Provider   cetirizine (ZyrTEC) 10 mg tablet Take 10 mg by mouth. Provider, Historical   prenatal vit-calcium-iron-fa (Prenatal Plus, calcium carb,) 27 mg iron- 1 mg tab Take 1 Tab by mouth daily. Provider, Historical   valACYclovir (VALTREX) 500 mg tablet Take 500 mg by mouth daily. Other, MD Zo        Review of Systems: A comprehensive review of systems was negative except for that written in the History of Present Illness. Objective:     Vitals:  126/65 P 90    Physical Exam:  Patient without distress. Abdomen: soft, nontender  Fundus: soft and non tender  Perineum: blood absent, amniotic fluid absent  Cervical Exam: 2/50/-2, soft  Membranes:  Intact  Fetal Heart Rate: Reactive          Bedside US: vtx    Prenatal Labs:   No results found for: ABORH, RUBELLAEXT, HBSAGEXT, HIVEXT, RPREXT, GONNOEXT, CHLAMEXT, ABORHEXT, RUBELLAEXT, GRBSEXT, HBSAGEXT, HIVEXT, RPREXT, GONNOEXT, CHLAMEXT    Impression/Plan:     24 yo G1 @ 39w3d IOL     Plan: Admit for induction of labor.   - cervix unfavorable.  Aguila balloon placed without difficulty and instilled with 60cc.  - HSV - no lesions or prodrome  - FSR/vtx/GBS neg/efw 11/16 4qa67de (79%)

## 2020-12-10 NOTE — PROGRESS NOTES
1815- assumed care of pt.     1828- dr. Justin Gregory in. Strip reviewed.  sve done and gtz balloon placed, 60cc in    1915- offgoing sbar report to laurynrn

## 2020-12-11 ENCOUNTER — ANESTHESIA (OUTPATIENT)
Dept: LABOR AND DELIVERY | Age: 20
End: 2020-12-11
Payer: COMMERCIAL

## 2020-12-11 ENCOUNTER — ANESTHESIA EVENT (OUTPATIENT)
Dept: LABOR AND DELIVERY | Age: 20
End: 2020-12-11
Payer: COMMERCIAL

## 2020-12-11 LAB
ABO + RH BLD: NORMAL
BLOOD GROUP ANTIBODIES SERPL: NORMAL
SPECIMEN EXP DATE BLD: NORMAL

## 2020-12-11 PROCEDURE — 77030014125 HC TY EPDRL BBMI -B: Performed by: ANESTHESIOLOGY

## 2020-12-11 PROCEDURE — 59200 INSERT CERVICAL DILATOR: CPT

## 2020-12-11 PROCEDURE — 74011000250 HC RX REV CODE- 250: Performed by: ANESTHESIOLOGY

## 2020-12-11 PROCEDURE — 75410000002 HC LABOR FEE PER 1 HR

## 2020-12-11 PROCEDURE — 74011250636 HC RX REV CODE- 250/636: Performed by: OBSTETRICS & GYNECOLOGY

## 2020-12-11 PROCEDURE — 77010026065 HC OXYGEN MINIMUM MEDICAL AIR

## 2020-12-11 PROCEDURE — 74011250637 HC RX REV CODE- 250/637: Performed by: OBSTETRICS & GYNECOLOGY

## 2020-12-11 PROCEDURE — 76060000078 HC EPIDURAL ANESTHESIA

## 2020-12-11 PROCEDURE — 10907ZC DRAINAGE OF AMNIOTIC FLUID, THERAPEUTIC FROM PRODUCTS OF CONCEPTION, VIA NATURAL OR ARTIFICIAL OPENING: ICD-10-PCS | Performed by: OBSTETRICS & GYNECOLOGY

## 2020-12-11 PROCEDURE — 75410000000 HC DELIVERY VAGINAL/SINGLE

## 2020-12-11 PROCEDURE — 65410000002 HC RM PRIVATE OB

## 2020-12-11 PROCEDURE — 75410000003 HC RECOV DEL/VAG/CSECN EA 0.5 HR

## 2020-12-11 RX ORDER — IBUPROFEN 400 MG/1
800 TABLET ORAL EVERY 8 HOURS
Status: DISCONTINUED | OUTPATIENT
Start: 2020-12-12 | End: 2020-12-13 | Stop reason: HOSPADM

## 2020-12-11 RX ORDER — NALOXONE HYDROCHLORIDE 0.4 MG/ML
0.4 INJECTION, SOLUTION INTRAMUSCULAR; INTRAVENOUS; SUBCUTANEOUS AS NEEDED
Status: DISCONTINUED | OUTPATIENT
Start: 2020-12-11 | End: 2020-12-13 | Stop reason: HOSPADM

## 2020-12-11 RX ORDER — BUPIVACAINE HYDROCHLORIDE 2.5 MG/ML
INJECTION, SOLUTION EPIDURAL; INFILTRATION; INTRACAUDAL
Status: COMPLETED
Start: 2020-12-11 | End: 2020-12-11

## 2020-12-11 RX ORDER — FENTANYL CITRATE 50 UG/ML
100 INJECTION, SOLUTION INTRAMUSCULAR; INTRAVENOUS AS NEEDED
Status: DISCONTINUED | OUTPATIENT
Start: 2020-12-11 | End: 2020-12-13 | Stop reason: HOSPADM

## 2020-12-11 RX ORDER — EPHEDRINE SULFATE/0.9% NACL/PF 50 MG/5 ML
10 SYRINGE (ML) INTRAVENOUS
Status: DISCONTINUED | OUTPATIENT
Start: 2020-12-11 | End: 2020-12-13 | Stop reason: HOSPADM

## 2020-12-11 RX ORDER — FENTANYL/BUPIVACAINE/NS/PF 2-1250MCG
1-18 PREFILLED PUMP RESERVOIR EPIDURAL CONTINUOUS
Status: DISCONTINUED | OUTPATIENT
Start: 2020-12-11 | End: 2020-12-13 | Stop reason: HOSPADM

## 2020-12-11 RX ORDER — OXYCODONE AND ACETAMINOPHEN 5; 325 MG/1; MG/1
2 TABLET ORAL
Status: DISCONTINUED | OUTPATIENT
Start: 2020-12-11 | End: 2020-12-13 | Stop reason: HOSPADM

## 2020-12-11 RX ORDER — ZOLPIDEM TARTRATE 5 MG/1
5 TABLET ORAL
Status: DISCONTINUED | OUTPATIENT
Start: 2020-12-11 | End: 2020-12-13 | Stop reason: HOSPADM

## 2020-12-11 RX ORDER — BUPIVACAINE HYDROCHLORIDE AND EPINEPHRINE 5; 5 MG/ML; UG/ML
INJECTION, SOLUTION EPIDURAL; INTRACAUDAL; PERINEURAL
Status: DISCONTINUED
Start: 2020-12-11 | End: 2020-12-11 | Stop reason: WASHOUT

## 2020-12-11 RX ORDER — OXYTOCIN/RINGER'S LACTATE 30/500 ML
10 PLASTIC BAG, INJECTION (ML) INTRAVENOUS AS NEEDED
Status: DISCONTINUED | OUTPATIENT
Start: 2020-12-11 | End: 2020-12-13 | Stop reason: HOSPADM

## 2020-12-11 RX ORDER — OXYTOCIN/RINGER'S LACTATE 30/500 ML
1-25 PLASTIC BAG, INJECTION (ML) INTRAVENOUS
Status: DISCONTINUED | OUTPATIENT
Start: 2020-12-11 | End: 2020-12-13 | Stop reason: HOSPADM

## 2020-12-11 RX ORDER — ADHESIVE BANDAGE
30 BANDAGE TOPICAL DAILY PRN
Status: DISCONTINUED | OUTPATIENT
Start: 2020-12-11 | End: 2020-12-13 | Stop reason: HOSPADM

## 2020-12-11 RX ORDER — HYDROCORTISONE ACETATE PRAMOXINE HCL 2.5; 1 G/100G; G/100G
CREAM TOPICAL AS NEEDED
Status: DISCONTINUED | OUTPATIENT
Start: 2020-12-11 | End: 2020-12-13 | Stop reason: HOSPADM

## 2020-12-11 RX ORDER — SIMETHICONE 80 MG
80 TABLET,CHEWABLE ORAL
Status: DISCONTINUED | OUTPATIENT
Start: 2020-12-11 | End: 2020-12-13 | Stop reason: HOSPADM

## 2020-12-11 RX ORDER — BUPIVACAINE HYDROCHLORIDE 2.5 MG/ML
INJECTION, SOLUTION EPIDURAL; INFILTRATION; INTRACAUDAL AS NEEDED
Status: DISCONTINUED | OUTPATIENT
Start: 2020-12-11 | End: 2020-12-11 | Stop reason: HOSPADM

## 2020-12-11 RX ORDER — ACETAMINOPHEN 325 MG/1
650 TABLET ORAL
Status: DISCONTINUED | OUTPATIENT
Start: 2020-12-11 | End: 2020-12-13 | Stop reason: HOSPADM

## 2020-12-11 RX ORDER — DIPHENHYDRAMINE HCL 25 MG
25 CAPSULE ORAL
Status: DISCONTINUED | OUTPATIENT
Start: 2020-12-11 | End: 2020-12-13 | Stop reason: HOSPADM

## 2020-12-11 RX ORDER — LIDOCAINE HYDROCHLORIDE AND EPINEPHRINE 20; 5 MG/ML; UG/ML
INJECTION, SOLUTION EPIDURAL; INFILTRATION; INTRACAUDAL; PERINEURAL AS NEEDED
Status: DISCONTINUED | OUTPATIENT
Start: 2020-12-11 | End: 2020-12-11 | Stop reason: HOSPADM

## 2020-12-11 RX ORDER — FENTANYL CITRATE 50 UG/ML
INJECTION, SOLUTION INTRAMUSCULAR; INTRAVENOUS
Status: DISCONTINUED
Start: 2020-12-11 | End: 2020-12-11 | Stop reason: WASHOUT

## 2020-12-11 RX ORDER — ONDANSETRON 4 MG/1
4 TABLET, ORALLY DISINTEGRATING ORAL
Status: ACTIVE | OUTPATIENT
Start: 2020-12-11 | End: 2020-12-12

## 2020-12-11 RX ORDER — CEFAZOLIN SODIUM 1 G/3ML
INJECTION, POWDER, FOR SOLUTION INTRAMUSCULAR; INTRAVENOUS AS NEEDED
Status: DISCONTINUED | OUTPATIENT
Start: 2020-12-11 | End: 2020-12-11

## 2020-12-11 RX ORDER — OXYTOCIN/RINGER'S LACTATE 30/500 ML
87.3 PLASTIC BAG, INJECTION (ML) INTRAVENOUS AS NEEDED
Status: DISCONTINUED | OUTPATIENT
Start: 2020-12-11 | End: 2020-12-13 | Stop reason: HOSPADM

## 2020-12-11 RX ADMIN — OXYTOCIN 1 MILLI-UNITS/MIN: 10 INJECTION, SOLUTION INTRAMUSCULAR; INTRAVENOUS at 06:39

## 2020-12-11 RX ADMIN — SODIUM CHLORIDE, POTASSIUM CHLORIDE, SODIUM LACTATE AND CALCIUM CHLORIDE 500 ML: 600; 310; 30; 20 INJECTION, SOLUTION INTRAVENOUS at 17:17

## 2020-12-11 RX ADMIN — VALACYCLOVIR HYDROCHLORIDE 500 MG: 500 TABLET, FILM COATED ORAL at 08:19

## 2020-12-11 RX ADMIN — SODIUM CHLORIDE, POTASSIUM CHLORIDE, SODIUM LACTATE AND CALCIUM CHLORIDE 999 ML/HR: 600; 310; 30; 20 INJECTION, SOLUTION INTRAVENOUS at 11:22

## 2020-12-11 RX ADMIN — SODIUM CHLORIDE, POTASSIUM CHLORIDE, SODIUM LACTATE AND CALCIUM CHLORIDE 999 ML/HR: 600; 310; 30; 20 INJECTION, SOLUTION INTRAVENOUS at 19:03

## 2020-12-11 RX ADMIN — LIDOCAINE HYDROCHLORIDE,EPINEPHRINE BITARTRATE 3 ML: 20; .005 INJECTION, SOLUTION EPIDURAL; INFILTRATION; INTRACAUDAL; PERINEURAL at 12:51

## 2020-12-11 RX ADMIN — SODIUM CHLORIDE, POTASSIUM CHLORIDE, SODIUM LACTATE AND CALCIUM CHLORIDE 125 ML/HR: 600; 310; 30; 20 INJECTION, SOLUTION INTRAVENOUS at 06:38

## 2020-12-11 RX ADMIN — Medication 10 ML: at 03:09

## 2020-12-11 RX ADMIN — Medication 12 ML/HR: at 20:31

## 2020-12-11 RX ADMIN — SODIUM CHLORIDE, POTASSIUM CHLORIDE, SODIUM LACTATE AND CALCIUM CHLORIDE 125 ML/HR: 600; 310; 30; 20 INJECTION, SOLUTION INTRAVENOUS at 18:06

## 2020-12-11 RX ADMIN — SODIUM CHLORIDE, POTASSIUM CHLORIDE, SODIUM LACTATE AND CALCIUM CHLORIDE 125 ML/HR: 600; 310; 30; 20 INJECTION, SOLUTION INTRAVENOUS at 14:17

## 2020-12-11 RX ADMIN — SODIUM CHLORIDE, POTASSIUM CHLORIDE, SODIUM LACTATE AND CALCIUM CHLORIDE 999 ML/HR: 600; 310; 30; 20 INJECTION, SOLUTION INTRAVENOUS at 10:53

## 2020-12-11 RX ADMIN — Medication 12 ML/HR: at 13:37

## 2020-12-11 RX ADMIN — BUPIVACAINE HYDROCHLORIDE 5 ML: 2.5 INJECTION, SOLUTION EPIDURAL; INFILTRATION; INTRACAUDAL; PERINEURAL at 12:59

## 2020-12-11 RX ADMIN — BUPIVACAINE HYDROCHLORIDE 5 ML: 2.5 INJECTION, SOLUTION EPIDURAL; INFILTRATION; INTRACAUDAL; PERINEURAL at 12:56

## 2020-12-11 NOTE — PROGRESS NOTES
Dr. Vega Hint here. View strip and v/s. States ok. L/o order to remove gtz bulb at 0630 and start  Pitocin drip at 0630.

## 2020-12-11 NOTE — PROGRESS NOTES
Labor Progress Note  Patient comfortable with epidural.     O:  Visit Vitals  /71   Pulse 65   Temp 98.2 °F (36.8 °C)   Resp 18   Ht 5' 4.5\" (1.638 m)   Wt 122.5 kg (270 lb)   SpO2 100%   Breastfeeding Yes   BMI 45.63 kg/m²     Gen: NAD  SVE: 5.5/80/-2, anterior, moderate    FHR: 150 / periods of minimal variability / no accels / early decels, category II, but overall remains reassuring. FHR increase noted in response to fetal scalp stimulation. [place]: 4 in 10 min, pitocin @ 15 mu/min    A/P:  23 y.o.  at 39w4d here undergoing eIOL. S/p CRB, on pitocin, s/p AROM. FHR cat I. Maternal/fetal status reassuring.  GBS neg.  - continue titrating pitocin per protocol  - will add maternal O2 for fetal variability, and reposition patient   - reevaluate in 4 hours, sooner PRN  - consider IUPC if unchanged at next check  - cEFM/toco  - epidural as desired  - continue to follow closely    Cheyanne Yost MD  2020  2:55 PM

## 2020-12-11 NOTE — PROGRESS NOTES
1915:Bedside shift change report given to Julian Simeon (oncoming nurse) by Patricia Pantoja RN (offgoing nurse). Report included the following information SBAR.     2110: Dr. Sherita Harris notified of elevated b/p's that are trending down possibly related to pain. Orders received to draw labs and notify physician if b/p's continue to be elevated. Will continue to monitor. 2640: Bedside shift change report given to KISHORE Conley RN (oncoming nurse) by Julian Simeon (offgoing nurse). Report included the following information SBAR.

## 2020-12-11 NOTE — PROGRESS NOTES
Pt examined at 0730 hrs. Cx 4/80/-1/IBOW/vtx. IC Balloon removed at 0630 hrs and Pitocin started. Cat 1 RNST. AROM at next check. Signed out to Dr. Roger Sandoval at 0800 hrs.

## 2020-12-11 NOTE — PROGRESS NOTES
Labor Progress Note  Patient comfortable with epidural.     O:  Visit Vitals  /87   Pulse 65   Temp 98.2 °F (36.8 °C)   Resp 18   Ht 5' 4.5\" (1.638 m)   Wt 122.5 kg (270 lb)   SpO2 100%   Breastfeeding Yes   BMI 45.63 kg/m²     Gen: NAD  SVE: 6/90/-1, anterior, moderate    FHR: 150 / periods of minimal variability / + accels / early decels, category II, but overall remains reassuring with good response to repositioning. [place]: 4-6 in 10 min, pitocin @ 15 mu/min    Nurse asked to decrease pitocin for tachysystole and minimal variability     A/P:  23 y.o.  at 39w4d here undergoing eIOL. S/p CRB, on pitocin, s/p AROM. FHR cat I. Maternal/fetal status reassuring.  GBS neg.  - continue titrating pitocin per protocol  - continue maternal O2 for fetal variability, and reposition patient   - reevaluate in 4 hours, sooner PRN  - consider IUPC if unchanged at next check  - cEFM/toco  - epidural as desired  - continue to follow closely    Becca Hernandez MD  2020  5:19 PM

## 2020-12-11 NOTE — PROGRESS NOTES
Labor Progress Note  Patient seen, fetal heart rate and contraction pattern evaluated. Patient reports she is comfortable with epidural in place. Physical Exam:  Visit Vitals  BP (!) 141/84   Pulse 65   Temp 98.2 °F (36.8 °C)   Resp 18   Ht 5' 4.5\" (1.638 m)   Wt 122.5 kg (270 lb)   SpO2 100%   Breastfeeding Yes   BMI 45.63 kg/m²     Cervical Exam: 6/90/-2  Membranes:  ruptured  Uterine Activity: Frequency: 2-5 minutes  Fetal Heart Rate: 1405 - 150,  Minimal variability and reactivity  Accelerations: yes  Decelerations: occasional early  Pitocin paused    Assessment/Plan:  Reassuring fetal status. Slow progress---IUPC placed. Restart augmentation    CHICA Barbosa MD

## 2020-12-11 NOTE — PROGRESS NOTES
0702: Report received from Trixie HORTON, assumed care of patient.  at bedside, SVE 4/80/-1.    1049:  at bedside, SVE 4cm, AROM clear fluids. 1055: Began bolus for epidural    1225: Time out for epidural placement done with . 1255: Pit paused due to inadequate toco monitoring while sitting for epidural.    1300: epidural placed successfully by . 1310: Pit restarted at previous rate. 1450: Periods of minimal variability, no acels, early decels, SVE by Dr. Homero Cole 5.5/8-/-2, oxygen applied per MD order. 1620: improved variability after oxygen applied, returned to minimal variability after  position changes,  requested pit rate be decreased. 1745: Prolonged decel,  notified, pit paused, and patient turned. 1800:  at bedside, SVE 6/90/-2, IUPC placed, verbal orders to restart pit at 8.    1900: Early/variable decels,  at bedside SVE 10cm. Plan to labor down. 1922: Report given to Kandis Larry RN, turned over patient care.

## 2020-12-11 NOTE — PROGRESS NOTES
Labor Progress Note  Patient overall comfortable, feeling some discomfort with contractions. O:  Visit Vitals  /74   Pulse 73   Temp 98.2 °F (36.8 °C)   Resp 18   Ht 5' 4.5\" (1.638 m)   Wt 122.5 kg (270 lb)   SpO2 98%   Breastfeeding Yes   BMI 45.63 kg/m²     Gen: NAD  SVE: 4.5/80/-2, midposition, moderate  AROM performed, clear fluid    FHR: 140 / moderate  variability / +accels / no decels, category I  [place]: 4 in 10 min, pitocin @ 11 mu/min    A/P:  23 y.o.  at 39w4d here undergoing eIOL. S/p CRB, on pitocin, s/p AROM with this check. FHR cat I. Maternal/fetal status reassuring.  GBS neg.  - continue titrating pitocin per protocol  - reevaluate in 4 hours, sooner PRN  - consider IUPC if unchanged at next check  - cEFM/toco  - epidural as desired  - continue to follow closely    Danny Sierra MD  2020  11:16 AM

## 2020-12-11 NOTE — ANESTHESIA PREPROCEDURE EVALUATION
Relevant Problems   No relevant active problems       Anesthetic History   No history of anesthetic complications            Review of Systems / Medical History  Patient summary reviewed, nursing notes reviewed and pertinent labs reviewed    Pulmonary            Asthma        Neuro/Psych   Within defined limits           Cardiovascular  Within defined limits                Exercise tolerance: >4 METS     GI/Hepatic/Renal  Within defined limits              Endo/Other        Morbid obesity     Other Findings              Physical Exam    Airway  Mallampati: II  TM Distance: 4 - 6 cm  Neck ROM: normal range of motion   Mouth opening: Normal     Cardiovascular  Regular rate and rhythm,  S1 and S2 normal,  no murmur, click, rub, or gallop             Dental  No notable dental hx       Pulmonary  Breath sounds clear to auscultation               Abdominal  GI exam deferred       Other Findings            Anesthetic Plan    ASA: 2  Anesthesia type: epidural            Anesthetic plan and risks discussed with: Patient

## 2020-12-11 NOTE — ANESTHESIA PROCEDURE NOTES
Epidural Block    Start time: 12/11/2020 12:20 PM  End time: 12/11/2020 1:15 PM  Performed by: Tana Alexander MD  Authorized by: Janiya Medrano MD     Pre-Procedure  Indication: labor epidural    Preanesthetic Checklist: patient identified, risks and benefits discussed, anesthesia consent, site marked, patient being monitored, timeout performed and anesthesia consent      Epidural:   Patient position:  Seated  Prep region:  Lumbar  Prep: Patient draped and Chlorhexidine    Location:  L2-3    Needle and Epidural Catheter:   Needle Type:  Tuohy  Needle Gauge:  17 G  Injection Technique:  Loss of resistance using saline  Attempts:  2  Catheter Size:  19 G  Catheter at Skin Depth (cm):  11  Depth in Epidural Space (cm):  6  Events: no blood with aspiration, no cerebrospinal fluid with aspiration, no paresthesia and negative aspiration test    Test Dose:  Negative    Assessment:   Catheter Secured:  Tape and tegaderm  Insertion:  Uncomplicated  Patient tolerance:  Patient tolerated the procedure well with no immediate complications

## 2020-12-12 PROCEDURE — 65410000002 HC RM PRIVATE OB

## 2020-12-12 PROCEDURE — 0KQM0ZZ REPAIR PERINEUM MUSCLE, OPEN APPROACH: ICD-10-PCS | Performed by: OBSTETRICS & GYNECOLOGY

## 2020-12-12 PROCEDURE — 74011250637 HC RX REV CODE- 250/637: Performed by: OBSTETRICS & GYNECOLOGY

## 2020-12-12 RX ORDER — SODIUM CHLORIDE 9 MG/ML
INJECTION INTRAMUSCULAR; INTRAVENOUS; SUBCUTANEOUS
Status: DISPENSED
Start: 2020-12-12 | End: 2020-12-12

## 2020-12-12 RX ORDER — LIDOCAINE HYDROCHLORIDE AND EPINEPHRINE 20; 5 MG/ML; UG/ML
INJECTION, SOLUTION EPIDURAL; INFILTRATION; INTRACAUDAL; PERINEURAL
Status: DISPENSED
Start: 2020-12-12 | End: 2020-12-12

## 2020-12-12 RX ADMIN — IBUPROFEN 800 MG: 400 TABLET, FILM COATED ORAL at 09:11

## 2020-12-12 RX ADMIN — IBUPROFEN 800 MG: 400 TABLET, FILM COATED ORAL at 17:01

## 2020-12-12 RX ADMIN — VALACYCLOVIR HYDROCHLORIDE 500 MG: 500 TABLET, FILM COATED ORAL at 09:11

## 2020-12-12 RX ADMIN — IBUPROFEN 800 MG: 400 TABLET, FILM COATED ORAL at 01:16

## 2020-12-12 NOTE — PROGRESS NOTES
Post-Partum Day Number 1 Progress Note    Mely LIZZ VermaQuan     Assessment: Doing well, post partum day 1    Plan:  1. Continue routine postpartum and perineal care as well as maternal education. 2. A few milldy elevated BPs- asymptomatic, Will monitor blood pressure    Information for the patient's :  Razia Mcgovern [[id number]]   Vaginal, Spontaneous    Patient doing well without significant complaint. Voiding without difficulty, normal lochia. Vitals:  Visit Vitals  /81 (BP 1 Location: Right arm, BP Patient Position: At rest)   Pulse 88   Temp 98.4 °F (36.9 °C)   Resp 17   Ht 5' 4.5\" (1.638 m)   Wt 122.5 kg (270 lb)   SpO2 96%   Breastfeeding Yes   BMI 45.63 kg/m²     Temp (24hrs), Av.6 °F (37 °C), Min:97.8 °F (36.6 °C), Max:99.9 °F (37.7 °C)        Exam:   Patient without distress. Abdomen soft, fundus firm, nontender                Perineum with normal lochia noted. Lower extremities are negative for swelling, cords or tenderness. Labs:     Lab Results   Component Value Date/Time    WBC 10.8 12/10/2020 05:56 PM    WBC 13.7 (H) 10/08/2020 11:31 PM    WBC 15.6 (H) 2019 11:38 PM    HGB 11.5 12/10/2020 05:56 PM    HGB 11.6 10/08/2020 11:31 PM    HGB 13.1 2019 11:38 PM    HCT 34.0 (L) 12/10/2020 05:56 PM    HCT 33.9 (L) 10/08/2020 11:31 PM    HCT 38.9 2019 11:38 PM    PLATELET 138  05:56 PM    PLATELET 622  11:31 PM    PLATELET 672  11:38 PM       No results found for this or any previous visit (from the past 24 hour(s)).

## 2020-12-12 NOTE — L&D DELIVERY NOTE
Delivery Summary  Patient: Donald Preston             Additional Delivery Comments - Patient was admitted the afternoon yesterday for IOL. On admission, she was 2cm. She had balloon placement, then pitocin and AROM. Epidural analgesia was placed. Fetal heart tones were Cat 1 and Cat 2 throughout the course of labor. The patient progressed through the first stage, then progressed through the second stage to a vaginal delivery. When the fetal vertex approached the perinuem with maternal pushing efforts, the patient was prepared for delivery. The baby was delivered without difficulty over intact perineum, a nuchal cord reduced on the perineum. She became active and crying, and was placed on the maternal abdomen. The cord was clamped and cut, and then the placenta delivered spontaneously, intact. The fundus became firm, the cervix and sulci were inspected and appeared to be intact. A second degree perineal laceration with a right labial extension was repaired with 3-0 Vicryl suture in layers. Vaginal exam revealed no evidence of hematoma formation or foreign bodies. Sponge and sharps count was correct. The procedure was tolerated adequately by the patient and she is recovering in stable condition.      Information for the patient's :  Krystina Hernandez [[id number]]     Delivery Type: Vaginal, Spontaneous   Delivery Date: 2020   Delivery Time: 11:28 PM     Birth Weight: 3.56 kg     Sex:  female  Delivery Clinician:  Jamarcus Grey   Gestational Age: 43w3d    Presentation: Vertex   Position: Right  Occiput  Anterior     Apgars were 8  and 9      Resuscitation Method: Tactile Stimulation;Suctioning-bulb     Meconium Stained: None    Living Status: Living       Placenta Date/Time: 2020 11:32 PM   Placenta Removal: Spontaneous   Placenta Appearance: Intact    Cord Information: 3 Vessels    Cord Events: Nuchal Cord With Compressions       Disposition of Cord Blood: Lab    Blood Gases Sent?:  No     Episiotomy: None   Laceration(s): 2nd     Estimated Blood Loss (ml): 250    Labor Events  Method: Oxytocin      Augmentation: None   Cervical Ripening:      Aguila/EASI

## 2020-12-12 NOTE — PROGRESS NOTES
Bedside shift change report given to CARLEE Cantrell (oncoming nurse) by [employer] (offgoing nurse). Report included the following information SBAR, Intake/Output, MAR and Recent Results.

## 2020-12-12 NOTE — ROUTINE PROCESS
Bedside shift change report given to GAYE Ventura RN (oncoming nurse) by Samuel Valadez. Adrianne Mathis RN (offgoing nurse). Report included the following information SBAR, Procedure Summary, Intake/Output, MAR and Recent Results.

## 2020-12-12 NOTE — PROGRESS NOTES
1915: Bedside shift change report given to Maurice Craven (oncoming nurse) by KISHORE Arredondo RN (offgoing nurse). Report included the following information SBAR. Pt laboring down at this time. : Began pushing with patient. : FSE applied by Dr. Haris Jasso at this time. 8:  of live baby girl delivered by Dr. Haris Jasso. 0145: Pt up to the bathroom with assistance, unable to void. Epidural cath removed with black tip intact. Linen changed, pt back to bed. Bedside shift change report given to Rosa Byrd RN (oncoming nurse) by Maurice Craven (offgoing nurse). Report included the following information SBAR.

## 2020-12-13 VITALS
OXYGEN SATURATION: 96 % | DIASTOLIC BLOOD PRESSURE: 79 MMHG | TEMPERATURE: 98.1 F | SYSTOLIC BLOOD PRESSURE: 135 MMHG | HEIGHT: 65 IN | RESPIRATION RATE: 16 BRPM | HEART RATE: 80 BPM | BODY MASS INDEX: 44.98 KG/M2 | WEIGHT: 270 LBS

## 2020-12-13 PROCEDURE — 74011250637 HC RX REV CODE- 250/637: Performed by: OBSTETRICS & GYNECOLOGY

## 2020-12-13 RX ADMIN — IBUPROFEN 800 MG: 400 TABLET, FILM COATED ORAL at 02:27

## 2020-12-13 RX ADMIN — VALACYCLOVIR HYDROCHLORIDE 500 MG: 500 TABLET, FILM COATED ORAL at 10:32

## 2020-12-13 RX ADMIN — IBUPROFEN 800 MG: 400 TABLET, FILM COATED ORAL at 10:33

## 2020-12-13 NOTE — PROGRESS NOTES
1200: Patient discharge instructions given. D/c prescriptions were sent electronically to the patient's preferred pharmacy. Verbalized understanding. All questions answered. No distress noted. Signed copy of discharge instructions on paper chart. Pt in room until infant is discharged.

## 2020-12-13 NOTE — ROUTINE PROCESS
Bedside shift change report given to KISHORE Gregory RN (oncoming nurse) by GAYE Rios RN (offgoing nurse). Report included the following information SBAR, Procedure Summary, Intake/Output, MAR and Recent Results.

## 2020-12-13 NOTE — DISCHARGE INSTRUCTIONS
POST DELIVERY DISCHARGE INSTRUCTIONS    Name: Abdi Cruz  YOB: 2000  Primary Diagnosis: Active Problems:    Normal pregnancy (12/10/2020)        General:     Diet/Diet Restrictions:  · Eight 8-ounce glasses of fluid daily (water, juices); avoid excessive caffeine intake. · Meals/snacks as desired which are high in fiber and carbohydrates and low in fat and cholesterol. Medications:         Physical Activity / Restrictions / Safety:     · Avoid heavy lifting, no more that 8 lbs. For 2-3 weeks;   · Limit use of stairs to 2 times daily for the first week home. · No driving for one week. · Avoid intercourse 4-6 weeks, no douching or tampon use. · Check with obstetrician before starting or resuming an exercise program.      Discharge Instructions/Special Treatment/Home Care Needs:     · Continue prenatal vitamins. · Continue to use squirt bottle with warm water on your episiotomy after each bathroom use until bleeding stops. · If steri-strips applied to your incision, remove in 7-10 days. Call your doctor for the following:     · Fever over 101 degrees by mouth. · Vaginal bleeding heavier than a normal menstrual period or clots larger than a golf ball. · Red streaks or increased swelling of legs, painful red streaks on your breast.  · Painful urination, constipation and increased pain or swelling or discharge with your incision. · If you feel extremely anxious or overwhelmed. · If you have thoughts of harming yourself and/or your baby. Pain Management:     · Take Acetaminophen (Tylenol) or Ibuprofen (Advil, Motrin), as directed for pain. · Use a warm Sitz bath 3 times daily to relieve episiotomy or hemorrhoidal discomfort. · For hemorrhoidal discomfort, use Tucks and Anusol cream as needed and directed. · Heating pad to  incision as needed.      Follow-Up Care:     Appointment with MD:   Follow-up Appointments   Procedures    FOLLOW UP VISIT Appointment in: 3 - 5 Days With Dr Arvin Tompkins for blood pressure check and 6 weeks for postpartum check     With Dr Arvin Tompkins for blood pressure check and 6 weeks for postpartum check     Standing Status:   Standing     Number of Occurrences:   1     Order Specific Question:   Appointment in     Answer:   3 - 5 Days     Telephone number: [phone]    Signed By: Preston Banuelos MD                                                                                                   Date: 12/13/2020 Time: 8:59 AM

## 2020-12-13 NOTE — PROGRESS NOTES
Post-Partum Day Number 2 Progress Note    Tonye Kawasaki     Assessment: Doing well, post partum day 2    Plan:   1. Discharge home today  2. Follow up in office in 6 weeks with Dolly Caputo  3. Post partum activity advised, diet as tolerated  4. Discharge Medications: ibuprofen and medications prior to admission  5. Patient has had a few mildly elevated BPs- 140s/70s- asymptomatic, follow up in office next week for BP check    Information for the patient's :  Piter Elena [[id number]]   Vaginal, Spontaneous    Patient doing well without significant complaint. Voiding without difficulty, normal lochia. Vitals:  Visit Vitals  /61   Pulse 83   Temp 98.2 °F (36.8 °C)   Resp 16   Ht 5' 4.5\" (1.638 m)   Wt 122.5 kg (270 lb)   SpO2 96%   Breastfeeding Yes   BMI 45.63 kg/m²     Temp (24hrs), Av.1 °F (36.7 °C), Min:98 °F (36.7 °C), Max:98.2 °F (36.8 °C)      Exam:         Patient without distress. Abdomen soft, fundus firm, nontender                 Lower extremities are negative for swelling, cords or tenderness. Labs:     Lab Results   Component Value Date/Time    WBC 10.8 12/10/2020 05:56 PM    WBC 13.7 (H) 10/08/2020 11:31 PM    WBC 15.6 (H) 2019 11:38 PM    HGB 11.5 12/10/2020 05:56 PM    HGB 11.6 10/08/2020 11:31 PM    HGB 13.1 2019 11:38 PM    HCT 34.0 (L) 12/10/2020 05:56 PM    HCT 33.9 (L) 10/08/2020 11:31 PM    HCT 38.9 2019 11:38 PM    PLATELET 273  05:56 PM    PLATELET 971  11:31 PM    PLATELET 515  11:38 PM       No results found for this or any previous visit (from the past 24 hour(s)).

## 2020-12-13 NOTE — DISCHARGE SUMMARY
Obstetrical Discharge Summary     Name: Denny Puentes MRN: [id number]  SSN: [id number]    YOB: 2000  Age: 23 y.o. Sex: female      Admit Date: 12/10/2020    Discharge Date: 2020     Admitting Physician: Lilli Drake MD     Attending Physician:  Ellis Guillen*     Admission Diagnoses: Normal pregnancy [Z34.90]    Discharge Diagnoses:   Information for the patient's :  Dolphus Pronto [[id number]]   Delivery of a 3.56 kg female infant via Vaginal, Spontaneous on 2020 at 11:28 PM  by Alpesh Rizvi. Apgars were 8  and 9 . Additional Diagnoses:   Hospital Problems  Never Reviewed          Codes Class Noted POA    Normal pregnancy ICD-10-CM: Z34.90  ICD-9-CM: V22.2  12/10/2020 Unknown             Lab Results   Component Value Date/Time    Rubella, External Immune 2020    GrBStrep, External Negative 2020       Hospital Course: Normal hospital course following the delivery. Disposition at Discharge: Home or self care    Discharged Condition: Stable    Patient Instructions:   Current Discharge Medication List      CONTINUE these medications which have NOT CHANGED    Details   prenatal vit-calcium-iron-fa (Prenatal Plus, calcium carb,) 27 mg iron- 1 mg tab Take 1 Tab by mouth daily. valACYclovir (VALTREX) 500 mg tablet Take 500 mg by mouth daily. cetirizine (ZyrTEC) 10 mg tablet Take 10 mg by mouth. Reference my discharge instructions.     Follow-up Appointments   Procedures    FOLLOW UP VISIT Appointment in: 3 - 11 Days With Dr Zeynep Brink for blood pressure check and 6 weeks for postpartum check     With Dr Zeynep Brink for blood pressure check and 6 weeks for postpartum check     Standing Status:   Standing     Number of Occurrences:   1     Order Specific Question:   Appointment in     Answer:   3 - 5 Days        Signed By:  Ayush Suarez MD     2020

## 2022-03-19 PROBLEM — Z34.90 NORMAL PREGNANCY: Status: ACTIVE | Noted: 2020-12-10

## 2022-03-19 PROBLEM — Z34.90 PREGNANCY: Status: ACTIVE | Noted: 2020-10-08

## 2024-03-09 ENCOUNTER — APPOINTMENT (OUTPATIENT)
Facility: HOSPITAL | Age: 24
End: 2024-03-09
Payer: COMMERCIAL

## 2024-03-09 ENCOUNTER — HOSPITAL ENCOUNTER (EMERGENCY)
Facility: HOSPITAL | Age: 24
Discharge: HOME | End: 2024-03-10
Attending: STUDENT IN AN ORGANIZED HEALTH CARE EDUCATION/TRAINING PROGRAM
Payer: COMMERCIAL

## 2024-03-09 VITALS
HEIGHT: 63 IN | SYSTOLIC BLOOD PRESSURE: 156 MMHG | OXYGEN SATURATION: 96 % | HEART RATE: 67 BPM | RESPIRATION RATE: 20 BRPM | DIASTOLIC BLOOD PRESSURE: 107 MMHG | TEMPERATURE: 97.9 F

## 2024-03-09 DIAGNOSIS — S82.892A CLOSED FRACTURE OF LEFT ANKLE, INITIAL ENCOUNTER: Primary | ICD-10-CM

## 2024-03-09 PROCEDURE — 99283 EMERGENCY DEPT VISIT LOW MDM: CPT

## 2024-03-09 PROCEDURE — 73600 X-RAY EXAM OF ANKLE: CPT

## 2024-03-09 PROCEDURE — 6370000000 HC RX 637 (ALT 250 FOR IP): Performed by: STUDENT IN AN ORGANIZED HEALTH CARE EDUCATION/TRAINING PROGRAM

## 2024-03-09 PROCEDURE — 73620 X-RAY EXAM OF FOOT: CPT

## 2024-03-09 PROCEDURE — 29515 APPLICATION SHORT LEG SPLINT: CPT

## 2024-03-09 RX ORDER — ACETAMINOPHEN 500 MG
1000 TABLET ORAL
Status: COMPLETED | OUTPATIENT
Start: 2024-03-09 | End: 2024-03-09

## 2024-03-09 RX ADMIN — ACETAMINOPHEN 1000 MG: 500 TABLET ORAL at 22:40

## 2024-03-09 ASSESSMENT — PAIN DESCRIPTION - ORIENTATION
ORIENTATION: LEFT
ORIENTATION: LEFT

## 2024-03-09 ASSESSMENT — PAIN DESCRIPTION - LOCATION
LOCATION: ANKLE
LOCATION: ANKLE

## 2024-03-09 ASSESSMENT — PAIN SCALES - GENERAL
PAINLEVEL_OUTOF10: 3
PAINLEVEL_OUTOF10: 3

## 2024-03-09 ASSESSMENT — LIFESTYLE VARIABLES
HOW MANY STANDARD DRINKS CONTAINING ALCOHOL DO YOU HAVE ON A TYPICAL DAY: 1 OR 2
HOW OFTEN DO YOU HAVE A DRINK CONTAINING ALCOHOL: 2-4 TIMES A MONTH

## 2024-03-09 ASSESSMENT — PAIN - FUNCTIONAL ASSESSMENT: PAIN_FUNCTIONAL_ASSESSMENT: 0-10

## 2024-03-10 RX ORDER — OXYCODONE HYDROCHLORIDE 5 MG/1
5 TABLET ORAL EVERY 6 HOURS PRN
Qty: 12 TABLET | Refills: 0 | Status: SHIPPED | OUTPATIENT
Start: 2024-03-10 | End: 2024-03-13

## 2024-03-10 RX ORDER — IBUPROFEN 800 MG/1
800 TABLET ORAL EVERY 6 HOURS PRN
Qty: 56 TABLET | Refills: 0 | Status: SHIPPED | OUTPATIENT
Start: 2024-03-10 | End: 2024-03-24

## 2024-03-10 ASSESSMENT — PAIN SCALES - GENERAL: PAINLEVEL_OUTOF10: 4

## 2024-03-10 NOTE — DISCHARGE INSTRUCTIONS
Date of Service: 10/31/2023     YOB: 1960     PREVIOUS PLANS:  Please see problem list for previous assessment and plan.    CHIEF COMPLAINT:   Chief Complaint   Patient presents with   • Follow-up   • Rheumatoid Arthritis     HISTORY OF PRESENT ILLNESS:   This is a 62 year old patient with the above mentioned problem list who comes in for follow up evaluation. Since last visit on 4/19/23 she has been struggling with health overall.  Orencia infusion 10/27/2023, continues 750 mg every 28 days.  Also continues hydroxychloroquine 200 mg twice daily.  Missed doses for June, July, and August as she had vertigo and frequent falls.  Feet are numb and tingling so she feels this has caused her increase in falls.  Today she is starting to feel better with regard to Orencia but notes this only lasts for 2.5-3 weeks out of every 4.  Feels like foot is being pulled backwards, can't stretch them out, trouble standing with pain, occurs every day, peaks mid day.  Starts in the legs, primarily thighs, describes a cramping.  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  Back surgery has been postponed as she is on Plavix until at least December.  Epidural steroid injections were helping with this leg pain, also has a spinal stimulator.  Thumbs are very painful, trouble tying shoes, opening jars.  Plavix causing bruising, denies bleeding gums.  Eye exam up to date. Dr. Duran, 8/31/23, okay to continue the Plaquenil.  Trouble sleeping as pain is significant.  Using Voltaren gel and Tylenol 1300 mg at bedtime which takes edge off.  Not using NSAIDs with stent.  Bladder stimulator so does have incontinence, no saddle anesthesia.     Kylie denies any recent headaches, visual changes, or jaw claudication. The patient also denies any new systemic complaints. Kylie is tolerating the current medications well and denies any adverse events or any new systemic rheumatic complaints. The  Thank You!    It was a pleasure taking care of you in our Emergency Department today. We know that when you come to our Emergency Department, you are entrusting us with your health, comfort, and safety. Our physicians and nurses honor that trust, and truly appreciate the opportunity to care for you and your loved ones.      We also value your feedback. If you receive a survey about your Emergency Department experience today, please fill it out.  We care about our patients' feedback, and we listen to what you have to say.  Thank you.    Estevan Mccrary MD  ________________________________________________________________________  I have included a copy of your lab results and/or radiologic studies from today's visit so you can have them easily available at your follow-up visit. We hope you feel better and please do not hesitate to contact the ED if you have any questions at all!    No results found for this or any previous visit (from the past 12 hour(s)).  XR FOOT LEFT (2 VIEWS)   Final Result   Acute nondisplaced intra-articular fracture of the distal posterior tibia. No   other acute fracture in the foot.      XR ANKLE LEFT (2 VIEWS)   Final Result   Acute nondisplaced intra-articular fracture of the distal posterior tibia. No   other acute fracture in the foot.          The exam and treatment you received in the Emergency Department were for an urgent problem and are not intended as complete care. It is important that you follow up with a doctor, nurse practitioner, or physician assistant for ongoing care. If your symptoms become worse or you do not improve as expected and you are unable to reach your usual health care provider, you should return to the Emergency Department. We are available 24 hours a day.    Please take your discharge instructions with you when you go to your follow-up appointment.     If a prescription has been provided, please have it filled as soon as possible to prevent a delay in  patient denies any headaches, visual changes, chest pain, difficulty breathing, nausea and/or vomiting, fever and/or chills, or changes in bowel and/or urinary habits.     HISTORIES:  I have reviewed the patient's medications and allergies, past medical, surgical, social and family history, updating these as appropriate.  See Histories section of the Electronic Medical Record for a display of this information.    REVIEW OF SYSTEMS:   As per the History of Present Illness.     PHYSICAL EXAMINATION:   Vitals:    10/31/23 0912   BP: 120/80   Pulse: (!) 55   SpO2: 96%   Weight: 85.3 kg (188 lb)   LMP: 09/20/2001   GENERAL: Well dressed and well developed.   HEENT: Normocephalic, atraumatic. Normal appearing sclerae and conjunctivae. Oropharynx clear. Nasal mucosa and lips without ulcerations.   NECK: Supple and nontender. No cervical or supraclavicular lymphadenopathy.   CARDIOVASCULAR: No edema. The temporal arteries were intact and palpable without any tenderness, cords, or other changes.   LUNGS: No wheezing. Breathing is unlabored.   EXTREMITIES: No cyanosis, or clubbing.   SKIN: No rashes, digital ulcers, periungual erythema, nail pitting, nodules, or sclerodactyly.   MUSCULOSKELETAL:  Full range of motion of the neck; full range of motion of the bilateral shoulders, bilateral elbows, and bilateral wrists. Diffusely tender to palpation over the joints and soft tissues of bilateral upper and lower extremities.  Small OA nodules of DIP joints.  No other nodules, synovitis, subcutaneous nodules, or nail changes.  Full range of motion of the bilateral hips, bilateral knees, and bilateral ankles.  There are no signs of synovitis, effusions, or Baker's cyst.   SPINE: Normal range of motion. There were multiple paraspinal muscle spasms and trigger points of the sub-occipital region, infraspinatus insertion, trochanteric region, back, and lumbar sacral regions. No focal neurologic deficits.  NEUROLOGICAL: Sensation intact.  Cranial nerves 2-12 intact.   PSYCHOLOGICAL: Alert and oriented x3. Mood and affect are normal.     LABORATORY DATA:   Component      Latest Ref Rn 10/27/2023  1:33 PM   WBC      4.2 - 11.0 K/mcL 5.3    RBC      4.00 - 5.20 mil/mcL 4.86    HGB      12.0 - 15.5 g/dL 12.7    HCT      36.0 - 46.5 % 37.7    MCV      78.0 - 100.0 fl 77.6 (L)    MCH      26.0 - 34.0 pg 26.1    MCHC      32.0 - 36.5 g/dL 33.7    RDW-CV      11.0 - 15.0 % 14.5    RDW-SD      39.0 - 50.0 fL 41.8    PLT      140 - 450 K/mcL 309    Neutrophil      % 60    LYMPH      % 25    MONO      % 12    EOSIN      % 2    BASO      % 1    Immature Granulocytes      % 0    Absolute Neutrophil      1.8 - 7.7 K/mcL 3.2    Absolute Lymph      1.0 - 4.0 K/mcL 1.3    Absolute Mono      0.3 - 0.9 K/mcL 0.6    Absolute Eos      0.0 - 0.5 K/mcL 0.1    Absolute Baso      0.0 - 0.3 K/mcL 0.1    Absolute Immature Granulocytes      0.0 - 0.2 K/mcL 0.0    Sodium      135 - 145 mmol/L 144    Potassium      3.4 - 5.1 mmol/L 4.2    Chloride      97 - 110 mmol/L 107    CO2      21 - 32 mmol/L 30    ANION GAP      7 - 19 mmol/L 11    Glucose      70 - 99 mg/dL 86    BUN      6 - 20 mg/dL 20    Creatinine      0.51 - 0.95 mg/dL 1.03 (H)    Glomerular Filtration Rate      >=60  61    BUN/CREATININE RATIO      7 - 25  19    CALCIUM      8.4 - 10.2 mg/dL 9.5    VITAMIND, 25 HYDROXY      30.0 - 100.0 ng/mL 31.2       Legend:  (L) Low  (H) High    Component      Latest Ref Rng 6/17/2023  1:23 PM   WBC      4.2 - 11.0 K/mcL 4.0 (L)    RBC      4.00 - 5.20 mil/mcL 5.17    HGB      12.0 - 15.5 g/dL 13.1    HCT      36.0 - 46.5 % 38.7    MCV      78.0 - 100.0 fl 74.9 (L)    MCH      26.0 - 34.0 pg 25.3 (L)    MCHC      32.0 - 36.5 g/dL 33.9    RDW-CV      11.0 - 15.0 % 14.0    RDW-SD      39.0 - 50.0 fL 38.5 (L)    PLT      140 - 450 K/mcL 314    Neutrophil      % 52    LYMPH      % 28    MONO      % 11    EOSIN      % 8    BASO      % 1    Immature Granulocytes      % 0    Absolute  Neutrophil      1.8 - 7.7 K/mcL 2.1    Absolute Lymph      1.0 - 4.0 K/mcL 1.1    Absolute Mono      0.3 - 0.9 K/mcL 0.5    Absolute Eos      0.0 - 0.5 K/mcL 0.3    Absolute Baso      0.0 - 0.3 K/mcL 0.0    Absolute Immature Granulocytes      0.0 - 0.2 K/mcL 0.0    Albumin      3.6 - 5.1 g/dL 3.7    TOTAL BILIRUBIN      0.2 - 1.0 mg/dL 0.6    DIRECT BILIRUBIN      0.0 - 0.2 mg/dL 0.2    ALK PHOSPHATASE      45 - 117 Units/L 68    ALT/SGPT      <64 Units/L 30    AST/SGOT      <=37 Units/L 25    TOTAL PROTEIN      6.4 - 8.2 g/dL 6.8    Creatinine      0.51 - 0.95 mg/dL 1.00 (H)    Glomerular Filtration Rate      >=60  63    VITAMIND, 25 HYDROXY      30.0 - 100.0 ng/mL 14.6 (L)    ESR      0 - 20 mm/hr 3    C-REACTIVE PROTEIN      <=1.0 mg/dL <0.3       Legend:  (L) Low  (H) High      Orders Placed This Encounter   • Quantiferon TB Plus   • DULoxetine (CYMBALTA) 20 MG capsule     Sig: Take 1 capsule by mouth daily.     Dispense:  30 capsule     Refill:  3   • Cholecalciferol (vitamin D) 50 mcg (2,000 units) capsule     Sig: Take 1 capsule by mouth daily.     Dispense:  90 capsule     Refill:  1     50 mcg = 2,000 units       ASSESSMENT/PLAN:     Rheumatoid arthritis of multiple sites without rheumatoid factor (CMD)  She continues to have pain and remains symptomatic although symptoms had improved with Orencia infusions, resumed doses x 2, missed June, July, August due to vertigo.  Feeling better with restarting this infusion.  Reports moderate to high disease activity.  Discussed various treatment options, including the option to do nothing at all.  History of CVA, avoid JOESPH therapies.  Recommend continuation of Orencia 750 mg IV every 4 weeks.  Continue Plaquenil 200 mg twice daily.  Not presently taking prednisone.  Patient reports a diagnosis of lupus and discoid lupus, she is aware that her labs are not consistent with this but that she has over the years had symptoms and signs consistent with SLE.  Repeat lupus  panels normal with previous set of labs.  Stopped diclofenac and NSAIDs with cardiac stent placement.  May use Voltaren gel topically yet and Tylenol 650-1300 mg TID prn joint pain.  Consider dose increasing Orencia to 1000 mg IV every 4 weeks if she continues to lose efficacy at week 3 of 4.  Due for Quantiferon-TB test, order in place to complete in the next few months.    Fibromyalgia  Diffusely tender to palpation.  Has multiple allergies including gabapentin, pregabalin, and venlafaxine.  Discussed various treatment options, including the option to do nothing at all.  Patient is willing to try low-dose Cymbalta 20 mg once daily with ability to increase dose if tolerated and as needed.  Continue to monitor.    Vitamin D deficiency  Previous vitamin-D deficiency, does not take supplement.  Recommend starting vitamin D3 2000 units once daily.  Prescription sent to pharmacy.  Repeat vitamin-D level at a future visit.     Labs in the next few months to include a Quantiferon-TB test.  I reviewed the side effects of all medications prescribed by me as listed in the physician's desk reference and in the package inserts.  Other reasonable and generally accepted treatment options, including the option to do nothing at all, were discussed. The patient was instructed by me to contact our office if the symptoms have not improved, worsened, or if there are any issues/concerns.  The collaborating physician for this visit is Dr. Lou Melara. The patient will return to clinic in [place] in about 4 months (around 2/29/2024) for RA Follow Up.    I spent a total of 31 minutes on the day of the visit.  This includes pre-charting, chart review, documenting and referring/communicating with other health care professionals.

## 2024-03-11 NOTE — ED PROVIDER NOTES
[facility] EMERGENCY DEPT  EMERGENCY DEPARTMENT ENCOUNTER       Pt Name: Yaneth Beyer  MRN: [id number]  Birthdate 2000  Date of evaluation: 3/9/2024  Provider: Estevan Mccrary MD   PCP: No primary care provider on file.  Note Started: 8:13 PM 3/10/24     CHIEF COMPLAINT       Chief Complaint   Patient presents with    Ankle Injury     Pt wheeled into triage reporting left ankle pain 1.5 hr PTA. Reports she slipped into mud and heard a \"crunch\" and cannot bear weight on foot. Took 800mg of Ibuprofen PTA. Ankle swollen in triage. Reports she also has poison Ivy and has a rashes to foot and hands.         HISTORY OF PRESENT ILLNESS: 1 or more elements      History From: Patient  None     Yaneth Beyer is a 23 y.o. female who presents to the ED following a fall. Patient states she slipped in mud, twisted left ankle and has been unable to bear weight since the fall. Reports moderately severe pain which improved only slightly with ibuprofen. Did not hit head, no LOC.      Nursing Notes were all reviewed and agreed with or any disagreements were addressed in the HPI.     REVIEW OF SYSTEMS      Review of Systems     Positives and Pertinent negatives as per HPI.    PAST HISTORY     Past Medical History:  Past Medical History:   Diagnosis Date    Asthma     no recent inhaler use    Genital herpes     Herpes gestationis     this week    Lumbar herniated disc          Past Surgical History:  No past surgical history on file.    Family History:  Family History   Problem Relation Age of Onset    Cancer Paternal Grandmother     Kidney Disease Paternal Grandfather     Diabetes Maternal Grandfather     Cancer Maternal Grandfather     Lung Disease Maternal Grandmother     Cancer Maternal Grandmother     Hypertension Mother     Diabetes Mother     Asthma Mother     Elevated Lipids Maternal Grandfather     Diabetes Paternal Grandfather     Cancer Paternal Grandfather     Dementia Paternal Grandmother     Hypertension Paternal

## 2024-03-12 ENCOUNTER — OFFICE VISIT (OUTPATIENT)
Age: 24
End: 2024-03-12
Payer: COMMERCIAL

## 2024-03-12 VITALS
HEART RATE: 118 BPM | HEIGHT: 63 IN | SYSTOLIC BLOOD PRESSURE: 168 MMHG | DIASTOLIC BLOOD PRESSURE: 99 MMHG | RESPIRATION RATE: 20 BRPM | TEMPERATURE: 97.7 F | OXYGEN SATURATION: 97 %

## 2024-03-12 DIAGNOSIS — S82.392D: Primary | ICD-10-CM

## 2024-03-12 PROCEDURE — 99204 OFFICE O/P NEW MOD 45 MIN: CPT | Performed by: ORTHOPAEDIC SURGERY

## 2024-03-12 RX ORDER — METHYLPREDNISOLONE 4 MG/1
4 TABLET ORAL SEE ADMIN INSTRUCTIONS
COMMUNITY
Start: 2024-02-27

## 2024-03-12 ASSESSMENT — PATIENT HEALTH QUESTIONNAIRE - PHQ9
1. LITTLE INTEREST OR PLEASURE IN DOING THINGS: 0
SUM OF ALL RESPONSES TO PHQ QUESTIONS 1-9: 0
SUM OF ALL RESPONSES TO PHQ QUESTIONS 1-9: 0
SUM OF ALL RESPONSES TO PHQ9 QUESTIONS 1 & 2: 0
SUM OF ALL RESPONSES TO PHQ QUESTIONS 1-9: 0
2. FEELING DOWN, DEPRESSED OR HOPELESS: 0
SUM OF ALL RESPONSES TO PHQ QUESTIONS 1-9: 0

## 2024-03-12 NOTE — PROGRESS NOTES
Identified pt with two pt identifiers (name and ). Reviewed chart in preparation for visit and have obtained necessary documentation.    Yaneth Beyer is a 23 y.o. female Fracture (F/u ED lt ankle fx )  .    Vitals:    24 1301 24 1304   BP: (!) 161/118 (!) 168/99   Site: Right Upper Arm Right Upper Arm   Position: Sitting Sitting   Cuff Size: Large Adult Large Adult   Pulse: (!) 128 (!) 118   Resp: 20    Temp: 97.7 °F (36.5 °C)    TempSrc: Oral    SpO2: 97%    Height: 1.6 m (5' 3\")           1. Have you been to the ER, urgent care clinic since your last visit?  Hospitalized since your last visit?  no     2. Have you seen or consulted any other health care providers outside of the [facility] since your last visit?  Include any pap smears or colon screening.  yes - [facility] for poison ivy pt believes saw Yudith HENDRIX 2024.     MD aware of elevated BP/HR.  
crutches, nonweightbearing.          Plan for nonoperative management of left ankle fracture.        Ms. Beyer has a reminder for a \"due or due soon\" health maintenance. I have asked that she contact her primary care provider for follow-up on this health maintenance.

## 2024-03-19 ENCOUNTER — OFFICE VISIT (OUTPATIENT)
Age: 24
End: 2024-03-19
Payer: COMMERCIAL

## 2024-03-19 VITALS
RESPIRATION RATE: 20 BRPM | DIASTOLIC BLOOD PRESSURE: 102 MMHG | BODY MASS INDEX: 37.02 KG/M2 | WEIGHT: 208.9 LBS | HEIGHT: 63 IN | HEART RATE: 135 BPM | SYSTOLIC BLOOD PRESSURE: 158 MMHG | OXYGEN SATURATION: 98 %

## 2024-03-19 DIAGNOSIS — S82.392D: Primary | ICD-10-CM

## 2024-03-19 PROCEDURE — 99213 OFFICE O/P EST LOW 20 MIN: CPT | Performed by: ORTHOPAEDIC SURGERY

## 2024-03-19 ASSESSMENT — PATIENT HEALTH QUESTIONNAIRE - PHQ9
2. FEELING DOWN, DEPRESSED OR HOPELESS: NOT AT ALL
1. LITTLE INTEREST OR PLEASURE IN DOING THINGS: NOT AT ALL
SUM OF ALL RESPONSES TO PHQ9 QUESTIONS 1 & 2: 0
SUM OF ALL RESPONSES TO PHQ QUESTIONS 1-9: 0

## 2024-03-19 NOTE — PROGRESS NOTES
Identified pt with two pt identifiers (name and ). Reviewed chart in preparation for visit and have obtained necessary documentation.    Yaneth Beyer is a 24 y.o. female Ankle Injury (F/u lt posterior malleolus fx )  .    Vitals:    24 1304   BP: (!) 158/102   Site: Left Upper Arm   Position: Sitting   Cuff Size: Large Adult   Pulse: (!) 135   Resp: 20   SpO2: 98%   Weight: 94.8 kg (208 lb 14.4 oz)   Height: 1.6 m (5' 3\")          1. Have you been to the ER, urgent care clinic since your last visit?  Hospitalized since your last visit?  no     2. Have you seen or consulted any other health care providers outside of the [facility] since your last visit?  Include any pap smears or colon screening.  no  
shortness of breath  Lungs: Denies chest pain, breathing problems, wheezing, pneumonia  Stomach: Denies stomach pain, heartburn, constipation, irritable bowel  Skin: Denies rash, sores, open wounds  Musculoskeletal: left ankle pain - resolving   Genitourinary: Denies dysuria, hematuria, polyuria  Gastrointestinal: Denies constipation, obstipation, diarrhea  Neurological: Denies changes in sight, smell, hearing, taste, seizures. Denies loss of consciousness.  Psychiatric: Denies depression, sleep pattern changes, anxiety, change in personality  Endocrine: Denies mood swings, heat or cold intolerance  Hematologic/Lymphatic: Denies anemia, purpura, petechia  Allergic/Immunologic: Denies swelling of throat, pain or swelling at lymph nodes      Physical Examination:    Vitals:    03/19/24 1304   BP: (!) 158/102   Pulse: (!) 135   Resp: 20   SpO2: 98%        General: AOX3, no apparent distress  Psychiatric: mood and affect appropriate  Lungs: breathing is symmetric and unlabored bilaterally  Heart: regular rate and rhythm  Abdomen: no guarding  Head: normocephalic, atraumatic  Skin: No significant abnormalities, good turgor  Sensation intact to light touch: C5-T1 dermatomes  Muscular exam: 5/5 strength in all major muscle groups unless noted in specialty exam.    Extremities        Left lower extremity:  No gross deformity.  Swelling and tenderness resolving.  No restriction to range of motion of the hip, knee, ankle.   Muscle bulk is appropriate without wasting.  Sensation is intact to light touch in the L1-S1 dermatomes.  Capillary refill is less than 2 seconds in the fingers.  Strength testing is 5/5 at the major muscle groups of the hip, knee, ankle.          Diagnostics:    Pertinent Diagnostics: xrays ordered and reviewed by myself indicate a posterior malleolus fracture, in acceptable alignment with no apparent healing.  Mortise is intact. No other osseus abnormalities or malalignments.    Assessment: left ankle

## 2024-04-02 ENCOUNTER — OFFICE VISIT (OUTPATIENT)
Age: 24
End: 2024-04-02
Payer: COMMERCIAL

## 2024-04-02 VITALS — BODY MASS INDEX: 37 KG/M2 | HEIGHT: 63 IN

## 2024-04-02 DIAGNOSIS — S82.392D: Primary | ICD-10-CM

## 2024-04-02 DIAGNOSIS — Z98.890 STATUS POST SURGERY: ICD-10-CM

## 2024-04-02 PROCEDURE — 99213 OFFICE O/P EST LOW 20 MIN: CPT | Performed by: ORTHOPAEDIC SURGERY

## 2024-04-02 ASSESSMENT — PATIENT HEALTH QUESTIONNAIRE - PHQ9
SUM OF ALL RESPONSES TO PHQ QUESTIONS 1-9: 0
SUM OF ALL RESPONSES TO PHQ QUESTIONS 1-9: 0
1. LITTLE INTEREST OR PLEASURE IN DOING THINGS: NOT AT ALL
SUM OF ALL RESPONSES TO PHQ9 QUESTIONS 1 & 2: 0
SUM OF ALL RESPONSES TO PHQ QUESTIONS 1-9: 0
2. FEELING DOWN, DEPRESSED OR HOPELESS: NOT AT ALL
SUM OF ALL RESPONSES TO PHQ QUESTIONS 1-9: 0

## 2024-04-02 NOTE — PROGRESS NOTES
4/2/2024      CC: left ankle fracture    HPI:      This is a 24 y.o. year old female who presents for a follow up visit.  The patient was last seen and diagnosed with left ankle fracture.   The patient's treatments since the most recent visit have comprised of nonoperative management in a boot.   The patient has had some relief of the chief complaint.        PMH:  Past Medical History:   Diagnosis Date    Asthma     no recent inhaler use    Genital herpes     Herpes gestationis     this week    Lumbar herniated disc        PSxHx:  No past surgical history on file.    Meds:    Current Outpatient Medications:     methylPREDNISolone (MEDROL DOSEPACK) 4 MG tablet, Take 1 tablet by mouth See Admin Instructions (Patient not taking: Reported on 4/2/2024), Disp: , Rfl:     ibuprofen (ADVIL;MOTRIN) 800 MG tablet, Take 1 tablet by mouth every 6 hours as needed for Pain, Disp: 56 tablet, Rfl: 0    All:  No Known Allergies    Social Hx:  Social History     Socioeconomic History    Marital status: Single     Spouse name: None    Number of children: None    Years of education: None    Highest education level: None   Tobacco Use    Smoking status: Never    Smokeless tobacco: Never   Substance and Sexual Activity    Alcohol use: No    Drug use: Never       Family Hx:  Family History   Problem Relation Age of Onset    Cancer Paternal Grandmother     Kidney Disease Paternal Grandfather     Diabetes Maternal Grandfather     Cancer Maternal Grandfather     Lung Disease Maternal Grandmother     Cancer Maternal Grandmother     Hypertension Mother     Diabetes Mother     Asthma Mother     Elevated Lipids Maternal Grandfather     Diabetes Paternal Grandfather     Cancer Paternal Grandfather     Dementia Paternal Grandmother     Hypertension Paternal Grandfather          Review of Systems:       General: Denies headache, lethargy, fever, weight loss  Ears/Nose/Throat: Denies ear discharge, drainage, nosebleeds, hoarse voice, dental

## 2024-04-02 NOTE — PROGRESS NOTES
Identified pt with two pt identifiers (name and ). Reviewed chart in preparation for visit and have obtained necessary documentation.    Yaneth Beyer is a 24 y.o. female Fracture (3 week f/u lt ankle fx)  .    Vitals:    24 1458   Height: 1.6 m (5' 3\")          1. Have you been to the ER, urgent care clinic since your last visit?  Hospitalized since your last visit?  no     2. Have you seen or consulted any other health care providers outside of the [facility] since your last visit?  Include any pap smears or colon screening.  no

## 2024-04-19 ENCOUNTER — OFFICE VISIT (OUTPATIENT)
Age: 24
End: 2024-04-19
Payer: COMMERCIAL

## 2024-04-19 DIAGNOSIS — S82.392D: Primary | ICD-10-CM

## 2024-04-19 PROCEDURE — 99213 OFFICE O/P EST LOW 20 MIN: CPT | Performed by: ORTHOPAEDIC SURGERY

## 2024-04-19 NOTE — PROGRESS NOTES
4/19/2024      CC: left ankle fracture    HPI:      This is a 24 y.o. year old female who presents for a follow up visit.  The patient was last seen and diagnosed with left ankle fracture.   The patient's treatments since the most recent visit have comprised of nonoperative management in a boot and crutches.   The patient has had moderate relief of the chief complaint.  She can walk at this point.      PMH:  Past Medical History:   Diagnosis Date    Asthma     no recent inhaler use    Genital herpes     Herpes gestationis     this week    Lumbar herniated disc        PSxHx:  No past surgical history on file.    Meds:    Current Outpatient Medications:     methylPREDNISolone (MEDROL DOSEPACK) 4 MG tablet, Take 1 tablet by mouth See Admin Instructions (Patient not taking: Reported on 4/2/2024), Disp: , Rfl:     ibuprofen (ADVIL;MOTRIN) 800 MG tablet, Take 1 tablet by mouth every 6 hours as needed for Pain, Disp: 56 tablet, Rfl: 0    All:  No Known Allergies    Social Hx:  Social History     Socioeconomic History    Marital status:      Spouse name: None    Number of children: None    Years of education: None    Highest education level: None   Tobacco Use    Smoking status: Never    Smokeless tobacco: Never   Substance and Sexual Activity    Alcohol use: No    Drug use: Never       Family Hx:  Family History   Problem Relation Age of Onset    Cancer Paternal Grandmother     Kidney Disease Paternal Grandfather     Diabetes Maternal Grandfather     Cancer Maternal Grandfather     Lung Disease Maternal Grandmother     Cancer Maternal Grandmother     Hypertension Mother     Diabetes Mother     Asthma Mother     Elevated Lipids Maternal Grandfather     Diabetes Paternal Grandfather     Cancer Paternal Grandfather     Dementia Paternal Grandmother     Hypertension Paternal Grandfather          Review of Systems:       General: Denies headache, lethargy, fever, weight loss  Ears/Nose/Throat: Denies ear discharge,

## 2024-04-19 NOTE — PROGRESS NOTES
Identified pt with two pt identifiers (name and ). Reviewed chart in preparation for visit and have obtained necessary documentation.    Yaneth Beyer is a 24 y.o. female Pain (Left Ankle pain )  .    There were no vitals filed for this visit.       1. Have you been to the ER, urgent care clinic since your last visit?  Hospitalized since your last visit?  no     2. Have you seen or consulted any other health care providers outside of the [facility] since your last visit?  Include any pap smears or colon screening.  no

## 2024-05-31 ENCOUNTER — OFFICE VISIT (OUTPATIENT)
Age: 24
End: 2024-05-31
Payer: COMMERCIAL

## 2024-05-31 VITALS — HEIGHT: 63 IN | BODY MASS INDEX: 37.01 KG/M2

## 2024-05-31 DIAGNOSIS — S82.392D: Primary | ICD-10-CM

## 2024-05-31 PROCEDURE — 99212 OFFICE O/P EST SF 10 MIN: CPT | Performed by: ORTHOPAEDIC SURGERY

## 2024-05-31 RX ORDER — CETIRIZINE HYDROCHLORIDE 10 MG/1
CAPSULE, LIQUID FILLED ORAL
COMMUNITY
Start: 2014-06-27

## 2024-05-31 RX ORDER — DESOGESTREL AND ETHINYL ESTRADIOL 0.15-0.03
1 KIT ORAL DAILY
COMMUNITY

## 2024-05-31 ASSESSMENT — PATIENT HEALTH QUESTIONNAIRE - PHQ9
SUM OF ALL RESPONSES TO PHQ9 QUESTIONS 1 & 2: 0
SUM OF ALL RESPONSES TO PHQ QUESTIONS 1-9: 0
SUM OF ALL RESPONSES TO PHQ QUESTIONS 1-9: 0
2. FEELING DOWN, DEPRESSED OR HOPELESS: NOT AT ALL
SUM OF ALL RESPONSES TO PHQ QUESTIONS 1-9: 0
1. LITTLE INTEREST OR PLEASURE IN DOING THINGS: NOT AT ALL
SUM OF ALL RESPONSES TO PHQ QUESTIONS 1-9: 0

## 2024-05-31 NOTE — PROGRESS NOTES
Identified pt with two pt identifiers (name and ). Reviewed chart in preparation for visit and have obtained necessary documentation.    Yaneth Beyer is a 24 y.o. female Follow-up (Left ankle)  .    Vitals:    24 1349   Height: 1.6 m (5' 2.99\")          1. Have you been to the ER, urgent care clinic since your last visit?  Hospitalized since your last visit?  no     2. Have you seen or consulted any other health care providers outside of the [facility] since your last visit?  Include any pap smears or colon screening.  no

## 2024-05-31 NOTE — PROGRESS NOTES
5/31/2024      CC: left ankle fracture    HPI:      This is a 24 y.o. year old female who presents for a follow up visit.  The patient was last seen and diagnosed with left ankle fracture.   The patient's treatments since the most recent visit have comprised of nonoperative management in a regular shoe.   The patient has had good relief of the chief complaint.        PMH:  Past Medical History:   Diagnosis Date    Asthma     no recent inhaler use    Genital herpes     Herpes gestationis     this week    Lumbar herniated disc        PSxHx:  No past surgical history on file.    Meds:    Current Outpatient Medications:     Cetirizine HCl (ZYRTEC ALLERGY) 10 MG CAPS, Take by oral route., Disp: , Rfl:     APRI 0.15-30 MG-MCG per tablet, Take 1 tablet by mouth daily, Disp: , Rfl:     methylPREDNISolone (MEDROL DOSEPACK) 4 MG tablet, Take 1 tablet by mouth See Admin Instructions (Patient not taking: Reported on 4/2/2024), Disp: , Rfl:     ibuprofen (ADVIL;MOTRIN) 800 MG tablet, Take 1 tablet by mouth every 6 hours as needed for Pain, Disp: 56 tablet, Rfl: 0    All:  No Known Allergies    Social Hx:  Social History     Socioeconomic History    Marital status:      Spouse name: None    Number of children: None    Years of education: None    Highest education level: None   Tobacco Use    Smoking status: Never    Smokeless tobacco: Never   Substance and Sexual Activity    Alcohol use: No    Drug use: Never       Family Hx:  Family History   Problem Relation Age of Onset    Cancer Paternal Grandmother     Kidney Disease Paternal Grandfather     Diabetes Maternal Grandfather     Cancer Maternal Grandfather     Lung Disease Maternal Grandmother     Cancer Maternal Grandmother     Hypertension Mother     Diabetes Mother     Asthma Mother     Elevated Lipids Maternal Grandfather     Diabetes Paternal Grandfather     Cancer Paternal Grandfather     Dementia Paternal Grandmother     Hypertension Paternal Grandfather

## 2024-07-15 ENCOUNTER — APPOINTMENT (OUTPATIENT)
Facility: HOSPITAL | Age: 24
End: 2024-07-15
Payer: COMMERCIAL

## 2024-07-15 ENCOUNTER — HOSPITAL ENCOUNTER (EMERGENCY)
Facility: HOSPITAL | Age: 24
Discharge: HOME | End: 2024-07-15
Payer: COMMERCIAL

## 2024-07-15 VITALS
RESPIRATION RATE: 18 BRPM | OXYGEN SATURATION: 99 % | SYSTOLIC BLOOD PRESSURE: 130 MMHG | WEIGHT: 240 LBS | TEMPERATURE: 98.1 F | HEART RATE: 100 BPM | DIASTOLIC BLOOD PRESSURE: 96 MMHG | HEIGHT: 64 IN | BODY MASS INDEX: 40.97 KG/M2

## 2024-07-15 DIAGNOSIS — S20.212A CONTUSION OF LEFT CHEST WALL, INITIAL ENCOUNTER: ICD-10-CM

## 2024-07-15 DIAGNOSIS — T14.8XXA CONTUSION OF LEFT CLAVICLE, INITIAL ENCOUNTER: ICD-10-CM
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
## 2024-07-15 DIAGNOSIS — S90.31XA CONTUSION OF RIGHT FOOT, INITIAL ENCOUNTER: ICD-10-CM

## 2024-07-15 PROCEDURE — 94761 N-INVAS EAR/PLS OXIMETRY MLT: CPT

## 2024-07-15 PROCEDURE — 73030 X-RAY EXAM OF SHOULDER: CPT

## 2024-07-15 PROCEDURE — 71046 X-RAY EXAM CHEST 2 VIEWS: CPT

## 2024-07-15 PROCEDURE — 99283 EMERGENCY DEPT VISIT LOW MDM: CPT

## 2024-07-15 PROCEDURE — 73630 X-RAY EXAM OF FOOT: CPT

## 2024-07-15 ASSESSMENT — PAIN - FUNCTIONAL ASSESSMENT
PAIN_FUNCTIONAL_ASSESSMENT: 0-10
PAIN_FUNCTIONAL_ASSESSMENT: 0-10

## 2024-07-15 ASSESSMENT — PAIN SCALES - GENERAL
PAINLEVEL_OUTOF10: 5
PAINLEVEL_OUTOF10: 5

## 2024-07-15 ASSESSMENT — LIFESTYLE VARIABLES
HOW MANY STANDARD DRINKS CONTAINING ALCOHOL DO YOU HAVE ON A TYPICAL DAY: PATIENT DOES NOT DRINK
HOW OFTEN DO YOU HAVE A DRINK CONTAINING ALCOHOL: NEVER
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
